# Patient Record
Sex: FEMALE | Race: WHITE | NOT HISPANIC OR LATINO | Employment: OTHER | URBAN - METROPOLITAN AREA
[De-identification: names, ages, dates, MRNs, and addresses within clinical notes are randomized per-mention and may not be internally consistent; named-entity substitution may affect disease eponyms.]

---

## 2017-05-02 ENCOUNTER — HISTORICAL (OUTPATIENT)
Dept: ADMINISTRATIVE | Facility: HOSPITAL | Age: 82
End: 2017-05-02

## 2017-05-02 LAB
BASOPHILS NFR BLD: 0.1 K/UL (ref 0–0.2)
BASOPHILS NFR BLD: 1.1 %
EOSINOPHIL NFR BLD: 0.6 K/UL (ref 0–0.7)
EOSINOPHIL NFR BLD: 4.9 %
ERYTHROCYTE [DISTWIDTH] IN BLOOD BY AUTOMATED COUNT: 17.4 % (ref 12.5–14.5)
GRAN #: 9.5 K/UL (ref 1.4–6.5)
GRAN%: 80.3 %
HCT VFR BLD AUTO: 34.7 % (ref 36–48)
HGB BLD-MCNC: 10.1 G/DL (ref 12–15)
IMMATURE GRANS (ABS): 0.1 K/UL (ref 0–1)
IMMATURE GRANULOCYTES: 0.4 %
LYMPH #: 0.9 K/UL (ref 1.2–3.4)
LYMPH%: 7.3 %
MCH RBC QN AUTO: 24.2 PG (ref 25–35)
MCHC RBC AUTO-ENTMCNC: 29.1 G/DL (ref 31–36)
MCV RBC AUTO: 83 FL (ref 79–98)
MONO #: 0.7 K/UL (ref 0.1–0.6)
MONO%: 6 %
NUCLEATED RBCS: 0 %
NUCLEATED RED BLOOD CELLS: 0 /100 WBC
PERFORMED BY:: ABNORMAL
PLATELET # BLD AUTO: 348 K/UL (ref 140–440)
PMV BLD AUTO: 8.9 FL (ref 8.8–12.7)
RBC # BLD AUTO: 4.18 M/UL (ref 3.5–5.5)
WBC # BLD: 11.9 K/UL (ref 5–10)

## 2017-05-03 VITALS
RESPIRATION RATE: 18 BRPM | DIASTOLIC BLOOD PRESSURE: 73 MMHG | HEART RATE: 76 BPM | WEIGHT: 100.63 LBS | HEIGHT: 63 IN | BODY MASS INDEX: 17.83 KG/M2 | SYSTOLIC BLOOD PRESSURE: 160 MMHG | TEMPERATURE: 98 F

## 2017-05-03 RX ORDER — TRAMADOL HYDROCHLORIDE 50 MG/1
50 TABLET ORAL EVERY 6 HOURS PRN
COMMUNITY
End: 2017-09-07 | Stop reason: SDUPTHER

## 2017-05-03 RX ORDER — ASPIRIN 81 MG/1
81 TABLET ORAL DAILY
COMMUNITY

## 2017-05-03 RX ORDER — CHOLECALCIFEROL (VITAMIN D3) 25 MCG
2000 TABLET ORAL DAILY
COMMUNITY

## 2017-05-03 RX ORDER — ALPRAZOLAM 0.25 MG/1
0.25 TABLET ORAL 3 TIMES DAILY
COMMUNITY
End: 2017-07-12 | Stop reason: SDUPTHER

## 2017-05-03 RX ORDER — METHOTREXATE 2.5 MG/1
TABLET ORAL
COMMUNITY
End: 2017-06-19 | Stop reason: SDUPTHER

## 2017-05-03 RX ORDER — DILTIAZEM HYDROCHLORIDE 120 MG/1
120 CAPSULE, EXTENDED RELEASE ORAL DAILY
COMMUNITY

## 2017-05-08 LAB
ALBUMIN SERPL-MCNC: 3.5 G/DL (ref 3.1–4.7)
ALP SERPL-CCNC: 84 IU/L (ref 40–104)
ALT (SGPT): 21 IU/L (ref 3–33)
AST SERPL-CCNC: 22 IU/L (ref 10–40)
BASOPHILS NFR BLD: 0.1 K/UL (ref 0–0.2)
BASOPHILS NFR BLD: 0.5 %
BILIRUB SERPL-MCNC: 0.3 MG/DL (ref 0.3–1)
BUN SERPL-MCNC: 20 MG/DL (ref 8–20)
CALCIUM SERPL-MCNC: 9.5 MG/DL (ref 7.7–10.4)
CHLORIDE: 99 MMOL/L (ref 98–110)
CO2 SERPL-SCNC: 33.2 MMOL/L (ref 22.8–31.6)
CREATININE: 1.11 MG/DL (ref 0.6–1.4)
EOSINOPHIL NFR BLD: 0.3 K/UL (ref 0–0.7)
EOSINOPHIL NFR BLD: 3 %
ERYTHROCYTE [DISTWIDTH] IN BLOOD BY AUTOMATED COUNT: 19.5 % (ref 12.5–14.5)
GLUCOSE: 135 MG/DL (ref 70–99)
GRAN #: 8.3 K/UL (ref 1.4–6.5)
GRAN%: 86.6 %
HCT VFR BLD AUTO: 36 % (ref 36–48)
HGB BLD-MCNC: 10.4 G/DL (ref 12–15)
IMMATURE GRANS (ABS): 0 K/UL (ref 0–1)
IMMATURE GRANULOCYTES: 0.4 %
LYMPH #: 0.5 K/UL (ref 1.2–3.4)
LYMPH%: 4.8 %
MCH RBC QN AUTO: 24.2 PG (ref 25–35)
MCHC RBC AUTO-ENTMCNC: 28.9 G/DL (ref 31–36)
MCV RBC AUTO: 83.9 FL (ref 79–98)
MONO #: 0.5 K/UL (ref 0.1–0.6)
MONO%: 4.7 %
NUCLEATED RBCS: 0 %
NUCLEATED RED BLOOD CELLS: 0 /100 WBC
PERFORMED BY:: ABNORMAL
PLATELET # BLD AUTO: 265 K/UL (ref 140–440)
PMV BLD AUTO: 9.2 FL (ref 8.8–12.7)
POTASSIUM SERPL-SCNC: 4.6 MMOL/L (ref 3.5–5)
PROT SERPL-MCNC: 6.1 G/DL (ref 6–8.2)
RBC # BLD AUTO: 4.29 M/UL (ref 3.5–5.5)
SODIUM: 140 MMOL/L (ref 134–144)
WBC # BLD: 9.6 K/UL (ref 5–10)

## 2017-05-10 DIAGNOSIS — N18.4 CHRONIC KIDNEY DISEASE, STAGE IV (SEVERE): Chronic | ICD-10-CM

## 2017-05-10 DIAGNOSIS — J44.9 CHRONIC OBSTRUCTIVE PULMONARY DISEASE, UNSPECIFIED COPD TYPE: Chronic | ICD-10-CM

## 2017-05-10 DIAGNOSIS — D63.1 ANEMIA OF CHRONIC RENAL FAILURE, STAGE 4 (SEVERE): Chronic | ICD-10-CM

## 2017-05-10 DIAGNOSIS — I48.20 CHRONIC ATRIAL FIBRILLATION: Chronic | ICD-10-CM

## 2017-05-10 DIAGNOSIS — I01.1 RHEUMATOID AORTITIS: Chronic | ICD-10-CM

## 2017-05-10 DIAGNOSIS — N18.4 ANEMIA OF CHRONIC RENAL FAILURE, STAGE 4 (SEVERE): Chronic | ICD-10-CM

## 2017-05-10 PROBLEM — I48.91 ATRIAL FIBRILLATION: Chronic | Status: ACTIVE | Noted: 2017-05-10

## 2017-05-22 LAB
ALBUMIN SERPL-MCNC: 3.6 G/DL (ref 3.1–4.7)
ALP SERPL-CCNC: 83 IU/L (ref 40–104)
ALT (SGPT): 35 IU/L (ref 3–33)
AST SERPL-CCNC: 43 IU/L (ref 10–40)
BASOPHILS NFR BLD: 0.1 K/UL (ref 0–0.2)
BASOPHILS NFR BLD: 0.7 %
BILIRUB SERPL-MCNC: 0.4 MG/DL (ref 0.3–1)
BUN SERPL-MCNC: 22 MG/DL (ref 8–20)
CALCIUM SERPL-MCNC: 9.6 MG/DL (ref 7.7–10.4)
CHLORIDE: 100 MMOL/L (ref 98–110)
CO2 SERPL-SCNC: 30.3 MMOL/L (ref 22.8–31.6)
CREATININE: 1.27 MG/DL (ref 0.6–1.4)
EOSINOPHIL NFR BLD: 0.1 K/UL (ref 0–0.7)
EOSINOPHIL NFR BLD: 1 %
ERYTHROCYTE [DISTWIDTH] IN BLOOD BY AUTOMATED COUNT: 21.6 % (ref 12.5–14.5)
GLUCOSE: 111 MG/DL (ref 70–99)
GRAN #: 6.3 K/UL (ref 1.4–6.5)
GRAN%: 87.5 %
HCT VFR BLD AUTO: 35.4 % (ref 36–48)
HGB BLD-MCNC: 10.3 G/DL (ref 12–15)
IMMATURE GRANS (ABS): 0 K/UL (ref 0–1)
IMMATURE GRANULOCYTES: 0.3 %
LYMPH #: 0.5 K/UL (ref 1.2–3.4)
LYMPH%: 6.3 %
MCH RBC QN AUTO: 24.9 PG (ref 25–35)
MCHC RBC AUTO-ENTMCNC: 29.1 G/DL (ref 31–36)
MCV RBC AUTO: 85.7 FL (ref 79–98)
MONO #: 0.3 K/UL (ref 0.1–0.6)
MONO%: 4.2 %
NUCLEATED RBCS: 0 %
NUCLEATED RED BLOOD CELLS: 0 /100 WBC
PERFORMED BY:: ABNORMAL
PLATELET # BLD AUTO: 247 K/UL (ref 140–440)
PMV BLD AUTO: 9.2 FL (ref 8.8–12.7)
POTASSIUM SERPL-SCNC: 4.7 MMOL/L (ref 3.5–5)
PROT SERPL-MCNC: 6.1 G/DL (ref 6–8.2)
RBC # BLD AUTO: 4.13 M/UL (ref 3.5–5.5)
SODIUM: 139 MMOL/L (ref 134–144)
WBC # BLD: 7.2 K/UL (ref 5–10)

## 2017-05-29 LAB
ALBUMIN SERPL-MCNC: 3.8 G/DL (ref 3.1–4.7)
ALP SERPL-CCNC: 84 IU/L (ref 40–104)
ALT (SGPT): 24 IU/L (ref 3–33)
AST SERPL-CCNC: 27 IU/L (ref 10–40)
BASOPHILS NFR BLD: 0 K/UL (ref 0–0.2)
BASOPHILS NFR BLD: 0.5 %
BILIRUB SERPL-MCNC: 0.6 MG/DL (ref 0.3–1)
BUN SERPL-MCNC: 21 MG/DL (ref 8–20)
CALCIUM SERPL-MCNC: 9.3 MG/DL (ref 7.7–10.4)
CHLORIDE: 97 MMOL/L (ref 98–110)
CO2 SERPL-SCNC: 32.2 MMOL/L (ref 22.8–31.6)
CREATININE: 1.3 MG/DL (ref 0.6–1.4)
EOSINOPHIL NFR BLD: 0.2 K/UL (ref 0–0.7)
EOSINOPHIL NFR BLD: 2.5 %
ERYTHROCYTE [DISTWIDTH] IN BLOOD BY AUTOMATED COUNT: 22.1 % (ref 12.5–14.5)
GLUCOSE: 97 MG/DL (ref 70–99)
GRAN #: 7.5 K/UL (ref 1.4–6.5)
GRAN%: 85 %
HCT VFR BLD AUTO: 35.4 % (ref 36–48)
HGB BLD-MCNC: 10.3 G/DL (ref 12–15)
IMMATURE GRANS (ABS): 0 K/UL (ref 0–1)
IMMATURE GRANULOCYTES: 0.5 %
LYMPH #: 0.6 K/UL (ref 1.2–3.4)
LYMPH%: 7 %
MCH RBC QN AUTO: 25.4 PG (ref 25–35)
MCHC RBC AUTO-ENTMCNC: 29.1 G/DL (ref 31–36)
MCV RBC AUTO: 87.4 FL (ref 79–98)
MONO #: 0.4 K/UL (ref 0.1–0.6)
MONO%: 4.5 %
NUCLEATED RBCS: 0 %
NUCLEATED RED BLOOD CELLS: 0 /100 WBC
PERFORMED BY:: ABNORMAL
PLATELET # BLD AUTO: 260 K/UL (ref 140–440)
PMV BLD AUTO: 8.9 FL (ref 8.8–12.7)
POTASSIUM SERPL-SCNC: 4.3 MMOL/L (ref 3.5–5)
PROT SERPL-MCNC: 6.3 G/DL (ref 6–8.2)
RBC # BLD AUTO: 4.05 M/UL (ref 3.5–5.5)
SODIUM: 138 MMOL/L (ref 134–144)
WBC # BLD: 8.8 K/UL (ref 5–10)

## 2017-06-05 ENCOUNTER — HISTORICAL (OUTPATIENT)
Dept: ADMINISTRATIVE | Facility: HOSPITAL | Age: 82
End: 2017-06-05

## 2017-06-05 LAB
ALBUMIN SERPL-MCNC: 3.8 G/DL (ref 3.1–4.7)
ALP SERPL-CCNC: 79 IU/L (ref 40–104)
ALT (SGPT): 26 IU/L (ref 3–33)
AST SERPL-CCNC: 24 IU/L (ref 10–40)
BASOPHILS NFR BLD: 0.1 K/UL (ref 0–0.2)
BASOPHILS NFR BLD: 0.7 %
BILIRUB SERPL-MCNC: 0.5 MG/DL (ref 0.3–1)
BUN SERPL-MCNC: 19 MG/DL (ref 8–20)
CALCIUM SERPL-MCNC: 9.5 MG/DL (ref 7.7–10.4)
CHLORIDE: 99 MMOL/L (ref 98–110)
CO2 SERPL-SCNC: 30.4 MMOL/L (ref 22.8–31.6)
CREATININE: 1.21 MG/DL (ref 0.6–1.4)
EOSINOPHIL NFR BLD: 0.2 K/UL (ref 0–0.7)
EOSINOPHIL NFR BLD: 2.4 %
ERYTHROCYTE [DISTWIDTH] IN BLOOD BY AUTOMATED COUNT: 22.4 % (ref 12.5–14.5)
GLUCOSE: 110 MG/DL (ref 70–99)
GRAN #: 7 K/UL (ref 1.4–6.5)
GRAN%: 84.5 %
HCT VFR BLD AUTO: 36.3 % (ref 36–48)
HGB BLD-MCNC: 10.9 G/DL (ref 12–15)
IMMATURE GRANS (ABS): 0 K/UL (ref 0–1)
IMMATURE GRANULOCYTES: 0.4 %
LYMPH #: 0.5 K/UL (ref 1.2–3.4)
LYMPH%: 6.4 %
MCH RBC QN AUTO: 26.5 PG (ref 25–35)
MCHC RBC AUTO-ENTMCNC: 30 G/DL (ref 31–36)
MCV RBC AUTO: 88.1 FL (ref 79–98)
MONO #: 0.5 K/UL (ref 0.1–0.6)
MONO%: 5.6 %
NUCLEATED RBCS: 0 %
NUCLEATED RED BLOOD CELLS: 0 /100 WBC
PERFORMED BY:: ABNORMAL
PLATELET # BLD AUTO: 250 K/UL (ref 140–440)
PMV BLD AUTO: 9.3 FL (ref 8.8–12.7)
POTASSIUM SERPL-SCNC: 4.2 MMOL/L (ref 3.5–5)
PROT SERPL-MCNC: 6.4 G/DL (ref 6–8.2)
RBC # BLD AUTO: 4.12 M/UL (ref 3.5–5.5)
SODIUM: 139 MMOL/L (ref 134–144)
WBC # BLD: 8.2 K/UL (ref 5–10)

## 2017-06-12 LAB
ALBUMIN SERPL-MCNC: 3.9 G/DL (ref 3.1–4.7)
ALP SERPL-CCNC: 73 IU/L (ref 40–104)
ALT (SGPT): 32 IU/L (ref 3–33)
AST SERPL-CCNC: 38 IU/L (ref 10–40)
BASOPHILS NFR BLD: 0.1 K/UL (ref 0–0.2)
BASOPHILS NFR BLD: 0.8 %
BILIRUB SERPL-MCNC: 0.4 MG/DL (ref 0.3–1)
BUN SERPL-MCNC: 18 MG/DL (ref 8–20)
CALCIUM SERPL-MCNC: 9.3 MG/DL (ref 7.7–10.4)
CHLORIDE: 103 MMOL/L (ref 98–110)
CO2 SERPL-SCNC: 29 MMOL/L (ref 22.8–31.6)
CREATININE: 1.21 MG/DL (ref 0.6–1.4)
EOSINOPHIL NFR BLD: 0.3 K/UL (ref 0–0.7)
EOSINOPHIL NFR BLD: 2.7 %
ERYTHROCYTE [DISTWIDTH] IN BLOOD BY AUTOMATED COUNT: 22.6 % (ref 12.5–14.5)
GLUCOSE: 113 MG/DL (ref 70–99)
GRAN #: 8.8 K/UL (ref 1.4–6.5)
GRAN%: 81.6 %
HCT VFR BLD AUTO: 37.3 % (ref 36–48)
HGB BLD-MCNC: 11.3 G/DL (ref 12–15)
IMMATURE GRANS (ABS): 0 K/UL (ref 0–1)
IMMATURE GRANULOCYTES: 0.4 %
LYMPH #: 0.8 K/UL (ref 1.2–3.4)
LYMPH%: 7.6 %
MCH RBC QN AUTO: 27 PG (ref 25–35)
MCHC RBC AUTO-ENTMCNC: 30.3 G/DL (ref 31–36)
MCV RBC AUTO: 89 FL (ref 79–98)
MONO #: 0.7 K/UL (ref 0.1–0.6)
MONO%: 6.9 %
NUCLEATED RBCS: 0 %
NUCLEATED RED BLOOD CELLS: 0 /100 WBC
PERFORMED BY:: ABNORMAL
PLATELET # BLD AUTO: 307 K/UL (ref 140–440)
PMV BLD AUTO: 9.4 FL (ref 8.8–12.7)
POTASSIUM SERPL-SCNC: 4.2 MMOL/L (ref 3.5–5)
PROT SERPL-MCNC: 6.5 G/DL (ref 6–8.2)
RBC # BLD AUTO: 4.19 M/UL (ref 3.5–5.5)
SODIUM: 139 MMOL/L (ref 134–144)
WBC # BLD: 10.7 K/UL (ref 5–10)

## 2017-06-19 ENCOUNTER — OFFICE VISIT (OUTPATIENT)
Dept: RHEUMATOLOGY | Facility: CLINIC | Age: 82
End: 2017-06-19
Payer: MEDICARE

## 2017-06-19 VITALS
BODY MASS INDEX: 19.14 KG/M2 | WEIGHT: 108 LBS | SYSTOLIC BLOOD PRESSURE: 140 MMHG | HEIGHT: 63 IN | DIASTOLIC BLOOD PRESSURE: 68 MMHG

## 2017-06-19 DIAGNOSIS — I01.1 RHEUMATOID AORTITIS: Primary | ICD-10-CM

## 2017-06-19 LAB
ALBUMIN SERPL-MCNC: 3.9 G/DL (ref 3.1–4.7)
ALP SERPL-CCNC: 72 IU/L (ref 40–104)
ALT (SGPT): 26 IU/L (ref 3–33)
AST SERPL-CCNC: 27 IU/L (ref 10–40)
BASOPHILS NFR BLD: 0.1 K/UL (ref 0–0.2)
BASOPHILS NFR BLD: 0.5 %
BILIRUB SERPL-MCNC: 0.5 MG/DL (ref 0.3–1)
BUN SERPL-MCNC: 21 MG/DL (ref 8–20)
CALCIUM SERPL-MCNC: 9.3 MG/DL (ref 7.7–10.4)
CHLORIDE: 102 MMOL/L (ref 98–110)
CO2 SERPL-SCNC: 29.4 MMOL/L (ref 22.8–31.6)
CREATININE: 1.26 MG/DL (ref 0.6–1.4)
EOSINOPHIL NFR BLD: 0 K/UL (ref 0–0.7)
EOSINOPHIL NFR BLD: 0.1 %
ERYTHROCYTE [DISTWIDTH] IN BLOOD BY AUTOMATED COUNT: 22.5 % (ref 12.5–14.5)
GLUCOSE: 112 MG/DL (ref 70–99)
GRAN #: 8.3 K/UL (ref 1.4–6.5)
GRAN%: 89 %
HCT VFR BLD AUTO: 34.2 % (ref 36–48)
HGB BLD-MCNC: 10.5 G/DL (ref 12–15)
IMMATURE GRANS (ABS): 0 K/UL (ref 0–1)
IMMATURE GRANULOCYTES: 0.3 %
LYMPH #: 0.5 K/UL (ref 1.2–3.4)
LYMPH%: 5.2 %
MCH RBC QN AUTO: 27.5 PG (ref 25–35)
MCHC RBC AUTO-ENTMCNC: 30.7 G/DL (ref 31–36)
MCV RBC AUTO: 89.5 FL (ref 79–98)
MONO #: 0.5 K/UL (ref 0.1–0.6)
MONO%: 4.9 %
NUCLEATED RBCS: 0 %
NUCLEATED RED BLOOD CELLS: 0 /100 WBC
PERFORMED BY:: ABNORMAL
PLATELET # BLD AUTO: 253 K/UL (ref 140–440)
PMV BLD AUTO: 9.3 FL (ref 8.8–12.7)
POTASSIUM SERPL-SCNC: 5 MMOL/L (ref 3.5–5)
PROT SERPL-MCNC: 6.4 G/DL (ref 6–8.2)
RBC # BLD AUTO: 3.82 M/UL (ref 3.5–5.5)
SODIUM: 138 MMOL/L (ref 134–144)
WBC # BLD: 9.3 K/UL (ref 5–10)

## 2017-06-19 PROCEDURE — 1159F MED LIST DOCD IN RCRD: CPT | Mod: ,,, | Performed by: INTERNAL MEDICINE

## 2017-06-19 PROCEDURE — 1125F AMNT PAIN NOTED PAIN PRSNT: CPT | Mod: ,,, | Performed by: INTERNAL MEDICINE

## 2017-06-19 PROCEDURE — 99213 OFFICE O/P EST LOW 20 MIN: CPT | Mod: ,,, | Performed by: INTERNAL MEDICINE

## 2017-06-19 RX ORDER — FAMOTIDINE 20 MG/1
20 TABLET, FILM COATED ORAL 2 TIMES DAILY
COMMUNITY

## 2017-06-19 RX ORDER — METHOTREXATE 2.5 MG/1
10 TABLET ORAL
Qty: 16 TABLET | Refills: 2 | Status: SHIPPED | OUTPATIENT
Start: 2017-06-19 | End: 2017-12-01 | Stop reason: SDUPTHER

## 2017-06-19 RX ORDER — PREDNISONE 5 MG/1
5 TABLET ORAL DAILY
Qty: 100 TABLET | Refills: 1 | Status: SHIPPED | OUTPATIENT
Start: 2017-06-19

## 2017-06-19 NOTE — PROGRESS NOTES
[facility] RHEUMATOLOGY            PROGRESS NOTE      Subjective:       Patient ID:   NAME: Michelle Power : 3/13/1934     83 y.o. female    Referring Doc: Self, Aaareferral  Other Physicians:    Chief Complaint:  Rheumatoid Arthritis and Pain (6/10 - had PT today and in a lot of pain)      History of Present Illness:     Patient returns today for a regularly scheduled follow-up visit for RA      The patient states she is overall ok  Denies  fevers ,chest pains or significant cough.  No gastrointestinal complaints.  No joint swelling.  Experiencing fluid retention ( pedal edema)  No significant morning stiffness.          ROS:   GEN:  No  fever, night sweats . weight is stable   + fatigue  SKIN: no rashes, no bruising, no ulcerations, no Raynaud's  HEENT: no HA's, No visual changes, no mucosal ulcers, no sicca symptoms,  CV:   no CP, +SOB, PND ,+ COFFMAN, no orthopnea, no palpitations  PULM: + SOB, mild cough,No  hemoptysis, sputum or pleuritic pain  GI:  no abdominal pain, nausea, vomiting, constipation, diarrhea, melanotic stools, BRBPR, hematemesis, no dysphagia  :   no dysuria  NEURO: no paresthesias, headaches, visual disturbances, muscle weakness  MUSCULOSKELETAL:no joint swelling, prolonged AM stiffness, no back pain, no muscle pain  Allergies:  Review of patient's allergies indicates:   Allergen Reactions    Codeine     Pcn [penicillins]     Plaquenil [hydroxychloroquine]     Shellfish containing products     Sulfa (sulfonamide antibiotics)        Medications:    Current Outpatient Prescriptions:     alprazolam (XANAX) 0.25 MG tablet, Take 0.25 mg by mouth 3 (three) times daily., Disp: , Rfl:     aspirin (ECOTRIN) 81 MG EC tablet, Take 81 mg by mouth once daily., Disp: , Rfl:     BACILLUS COAGULANS (PROBIOTIC, B. COAGULANS, ORAL), Take by mouth., Disp: , Rfl:     diltiaZEM (DILACOR XR) 120 MG CDCR, Take 120 mg by mouth once daily., Disp: , Rfl:     famotidine (PEPCID) 20 MG tablet, Take 20 mg by  "mouth 2 (two) times daily., Disp: , Rfl:     FLUTICASONE/VILANTEROL (BREO ELLIPTA INHL), Inhale into the lungs., Disp: , Rfl:     methotrexate 2.5 MG Tab, Take 4 tablets (10 mg total) by mouth every 7 days., Disp: 16 tablet, Rfl: 2    PRENATAL #108-IRON,CARBONYL-FA ORAL, Take by mouth., Disp: , Rfl:     tramadol (ULTRAM) 50 mg tablet, Take 50 mg by mouth every 6 (six) hours as needed for Pain., Disp: , Rfl:     UMECLIDINIUM BROMIDE (INCRUSE ELLIPTA INHL), Inhale into the lungs., Disp: , Rfl:     vitamin D 1000 units Tab, Take 2,000 Units by mouth once daily., Disp: , Rfl:     CEFTRIAXONE SODIUM (ROCEPHIN INJ), Inject as directed., Disp: , Rfl:     predniSONE (DELTASONE) 5 MG tablet, Take 1 tablet (5 mg total) by mouth once daily., Disp: 100 tablet, Rfl: 1    PMHx/PSHx Updates:  Pulmonary Nocardiosis      Objective:     Vitals:  Blood pressure (!) 140/68, height 5' 3" (1.6 m), weight 49 kg (108 lb).    Physical Examination:   GEN: no apparent distress, comfortable; AAOx3  SKIN: no rashes,no ulceration, no Raynaud's, no petechiae, no SQ nodules,  HEAD: normal  EYES: no pallor, no icterus, PERRLA  NECK: no masses, thyroid normal, trachea midline, no LAD/LN's, supple  CV: RRR with no murmur; l S1 and S2 reg. ,no gallop no rubs,   CHEST:  normal breath sounds; no wheeze or crackles  ABDOM: nontender and nondistended; soft; no masses; no rebound/guarding  MUSC/Skeletal: ROM normal; no crepitus; joints without synovitis,  no deformities  No joint swelling or tenderness of PIP, MCP, wrist, elbow, shoulder, or knee joints  EXTREM: no clubbing, cyanosis, no edema,normal  pulses   NEURO: grossly intact; motor WNL; AAOx3; ,PSYCH: normal mood, affect and behavior  LYMPH: normal cervical, supraclavicular          Labs:   Lab Results   Component Value Date    WBC 9.3 06/19/2017    HGB 10.5 (L) 06/19/2017    HCT 34.2 (L) 06/19/2017     06/19/2017    " CMP  @LASTLAB(NA,K,CL,CO2,GLU,BUN,Creatinine,Calcium,PROT,Albumin,Bilitot,Alkphos,AST,ALT,CRP,ESR,RF,CCP,JIM,SSA,CPK,uric acid) )@  I have reviewed all available lab results and radiology reports.    Radiology/Diagnostic Studies:        Assessment/Plan:   (1) 83 y.o. female with diagnosis of Rheumatoid arthritis. She is stable.                  Discussion:     I have explained all of the above in detail and the patient understands all of the current recommendation(s). I have answered all questions to the best of my ability and to their complete satisfaction.       The patient is to continue with the current management plan         RTC in   3 months      Electronically signed by Vahid Hernandez MD

## 2017-06-22 ENCOUNTER — OFFICE VISIT (OUTPATIENT)
Dept: FAMILY MEDICINE | Facility: CLINIC | Age: 82
End: 2017-06-22
Payer: MEDICARE

## 2017-06-22 VITALS
TEMPERATURE: 99 F | HEIGHT: 63 IN | SYSTOLIC BLOOD PRESSURE: 126 MMHG | HEART RATE: 68 BPM | WEIGHT: 108 LBS | BODY MASS INDEX: 19.14 KG/M2 | RESPIRATION RATE: 20 BRPM | DIASTOLIC BLOOD PRESSURE: 64 MMHG

## 2017-06-22 DIAGNOSIS — S69.91XA INJURY OF NAIL BED OF FINGER OF RIGHT HAND, INITIAL ENCOUNTER: ICD-10-CM

## 2017-06-22 DIAGNOSIS — R60.1 GENERALIZED EDEMA: ICD-10-CM

## 2017-06-22 DIAGNOSIS — J01.00 ACUTE NON-RECURRENT MAXILLARY SINUSITIS: ICD-10-CM

## 2017-06-22 PROCEDURE — 99213 OFFICE O/P EST LOW 20 MIN: CPT | Mod: ,,, | Performed by: FAMILY MEDICINE

## 2017-06-22 PROCEDURE — 1159F MED LIST DOCD IN RCRD: CPT | Mod: ,,, | Performed by: FAMILY MEDICINE

## 2017-06-22 RX ORDER — PROMETHAZINE HYDROCHLORIDE AND DEXTROMETHORPHAN HYDROBROMIDE 6.25; 15 MG/5ML; MG/5ML
5 SYRUP ORAL 4 TIMES DAILY
Qty: 180 ML | Refills: 2 | Status: SHIPPED | OUTPATIENT
Start: 2017-06-22 | End: 2017-07-02

## 2017-06-22 RX ORDER — AZITHROMYCIN 250 MG/1
250 TABLET, FILM COATED ORAL DAILY
Qty: 6 TABLET | Refills: 0 | Status: SHIPPED | OUTPATIENT
Start: 2017-06-22 | End: 2017-09-11 | Stop reason: ALTCHOICE

## 2017-06-22 NOTE — PROGRESS NOTES
Subjective:       Patient ID: Michelle Power is a 83 y.o. female.    Chief Complaint: Sinus Problem; Leg Swelling; Foot Swelling; and Hand Pain    Sinus Problem   The current episode started 1 to 4 weeks ago (2-3 weeks). The problem has been waxing and waning since onset. There has been no fever. The fever has been present for less than 1 day. The pain is mild. Past treatments include acetaminophen (mucinex). The treatment provided no relief.   Hand Pain    Incident onset: deformity of right 5th nail had infection 4 weeks ago. Pertinent negatives include no chest pain.   Edema   This is a recurrent problem. The current episode started 1 to 4 weeks ago. The problem occurs daily. The problem has been gradually worsening. Pertinent negatives include no arthralgias, chest pain or fever. Exacerbated by: stockings are too tight -doesnt wear them.     Review of Systems   Constitutional: Negative for fever.   Cardiovascular: Negative for chest pain.   Musculoskeletal: Negative for arthralgias.       Objective:      Physical Exam   Constitutional: She appears well-developed and well-nourished. No distress.   HENT:   Head: Normocephalic and atraumatic.   Right Ear: External ear normal.   Left Ear: External ear normal.   Eyes: Conjunctivae and EOM are normal. Pupils are equal, round, and reactive to light. Right eye exhibits no discharge. Left eye exhibits no discharge. No scleral icterus.   Neck: Normal range of motion. Neck supple.   Cardiovascular: Normal rate, regular rhythm, normal heart sounds and intact distal pulses.    Pulmonary/Chest: Effort normal and breath sounds normal.   Musculoskeletal:        Legs:  Bilateral +3 edema with out compression.   Skin: She is not diaphoretic.          Both legs wrapped with ace wraps.  Assessment:       1. Generalized edema    2. Injury of nail bed of finger of right hand, initial encounter    3. Acute non-recurrent maxillary sinusitis        Plan:       Michelle was seen today for  sinus problem, leg swelling, foot swelling and hand pain.    Diagnoses and all orders for this visit:    Generalized edema    Injury of nail bed of finger of right hand, initial encounter    Acute non-recurrent maxillary sinusitis  -     azithromycin (Z-TRISH) 250 MG tablet; Take 1 tablet (250 mg total) by mouth once daily. po on day 1 then 1 tab po on days 2-5  -     promethazine-dextromethorphan (PROMETHAZINE-DM) 6.25-15 mg/5 mL Syrp; Take 5 mLs by mouth 4 (four) times daily.         Return in about 3 months (around 9/22/2017).

## 2017-06-26 LAB
HCT VFR BLD AUTO: 27.7 % (ref 36–48)
HGB BLD-MCNC: 8.8 G/DL (ref 12–15)

## 2017-06-27 LAB
BUN SERPL-MCNC: 21 MG/DL (ref 8–20)
CALCIUM SERPL-MCNC: 8.3 MG/DL (ref 7.7–10.4)
CHLORIDE: 99 MMOL/L (ref 98–110)
CO2 SERPL-SCNC: 27.9 MMOL/L (ref 22.8–31.6)
CREATININE: 1.12 MG/DL (ref 0.6–1.4)
GLUCOSE: 130 MG/DL (ref 70–99)
HCT VFR BLD AUTO: 24.9 % (ref 36–48)
HGB BLD-MCNC: 8.1 G/DL (ref 12–15)
MCH RBC QN AUTO: 28.6 PG (ref 25–35)
MCHC RBC AUTO-ENTMCNC: 32.5 G/DL (ref 31–36)
MCV RBC AUTO: 88 FL (ref 79–98)
NUCLEATED RBCS: 0 %
PLATELET # BLD AUTO: 226 K/UL (ref 140–440)
POTASSIUM SERPL-SCNC: 4.4 MMOL/L (ref 3.5–5)
RBC # BLD AUTO: 2.83 M/UL (ref 3.5–5.5)
SODIUM: 136 MMOL/L (ref 134–144)
WBC # BLD AUTO: 13.6 K/UL (ref 5–10)

## 2017-06-28 LAB
ALBUMIN SERPL-MCNC: 2.9 G/DL (ref 3.1–4.7)
ALP SERPL-CCNC: 44 IU/L (ref 40–104)
ALT (SGPT): 11 IU/L (ref 3–33)
AST SERPL-CCNC: 18 IU/L (ref 10–40)
BILIRUB SERPL-MCNC: 0.8 MG/DL (ref 0.3–1)
BUN SERPL-MCNC: 25 MG/DL (ref 8–20)
CALCIUM SERPL-MCNC: 8.9 MG/DL (ref 7.7–10.4)
CHLORIDE: 106 MMOL/L (ref 98–110)
CO2 SERPL-SCNC: 26.8 MMOL/L (ref 22.8–31.6)
CREATININE: 1.09 MG/DL (ref 0.6–1.4)
GLUCOSE: 130 MG/DL (ref 70–99)
HCT VFR BLD AUTO: 27.3 % (ref 36–48)
HGB BLD-MCNC: 9.1 G/DL (ref 12–15)
MCH RBC QN AUTO: 29.6 PG (ref 25–35)
MCHC RBC AUTO-ENTMCNC: 33.3 G/DL (ref 31–36)
MCV RBC AUTO: 88.9 FL (ref 79–98)
NUCLEATED RBCS: 0 %
PLATELET # BLD AUTO: 193 K/UL (ref 140–440)
POTASSIUM SERPL-SCNC: 3.8 MMOL/L (ref 3.5–5)
PROT SERPL-MCNC: 5.1 G/DL (ref 6–8.2)
RBC # BLD AUTO: 3.07 M/UL (ref 3.5–5.5)
SODIUM: 141 MMOL/L (ref 134–144)
WBC # BLD AUTO: 9.3 K/UL (ref 5–10)

## 2017-06-29 LAB
BUN SERPL-MCNC: 29 MG/DL (ref 8–20)
CALCIUM SERPL-MCNC: 8.5 MG/DL (ref 7.7–10.4)
CHLORIDE: 104 MMOL/L (ref 98–110)
CO2 SERPL-SCNC: 30 MMOL/L (ref 22.8–31.6)
CREATININE: 1.21 MG/DL (ref 0.6–1.4)
GLUCOSE: 134 MG/DL (ref 70–99)
HCT VFR BLD AUTO: 31 % (ref 36–48)
HGB BLD-MCNC: 10.3 G/DL (ref 12–15)
MCH RBC QN AUTO: 29.3 PG (ref 25–35)
MCHC RBC AUTO-ENTMCNC: 33.2 G/DL (ref 31–36)
MCV RBC AUTO: 88.1 FL (ref 79–98)
NUCLEATED RBCS: 0 %
PLATELET # BLD AUTO: 183 K/UL (ref 140–440)
POTASSIUM SERPL-SCNC: 3.9 MMOL/L (ref 3.5–5)
RBC # BLD AUTO: 3.52 M/UL (ref 3.5–5.5)
SODIUM: 140 MMOL/L (ref 134–144)
WBC # BLD AUTO: 8.4 K/UL (ref 5–10)

## 2017-07-05 RX ORDER — CALCITONIN SALMON 200 [IU]/.09ML
SPRAY, METERED NASAL
Qty: 11.1 ML | Refills: 0 | OUTPATIENT
Start: 2017-07-05

## 2017-07-10 ENCOUNTER — OFFICE VISIT (OUTPATIENT)
Dept: HEMATOLOGY/ONCOLOGY | Facility: CLINIC | Age: 82
End: 2017-07-10
Payer: MEDICARE

## 2017-07-10 VITALS
WEIGHT: 108 LBS | HEART RATE: 80 BPM | SYSTOLIC BLOOD PRESSURE: 117 MMHG | DIASTOLIC BLOOD PRESSURE: 63 MMHG | BODY MASS INDEX: 19.14 KG/M2 | HEIGHT: 63 IN | TEMPERATURE: 98 F | RESPIRATION RATE: 18 BRPM

## 2017-07-10 DIAGNOSIS — N18.4 ANEMIA OF CHRONIC RENAL FAILURE, STAGE 4 (SEVERE): Chronic | ICD-10-CM

## 2017-07-10 DIAGNOSIS — D63.1 ANEMIA OF CHRONIC RENAL FAILURE, STAGE 4 (SEVERE): Chronic | ICD-10-CM

## 2017-07-10 PROCEDURE — 99213 OFFICE O/P EST LOW 20 MIN: CPT | Mod: ,,, | Performed by: INTERNAL MEDICINE

## 2017-07-10 PROCEDURE — 1159F MED LIST DOCD IN RCRD: CPT | Mod: ,,, | Performed by: INTERNAL MEDICINE

## 2017-07-10 PROCEDURE — 1125F AMNT PAIN NOTED PAIN PRSNT: CPT | Mod: ,,, | Performed by: INTERNAL MEDICINE

## 2017-07-10 RX ORDER — HYDROCODONE BITARTRATE AND ACETAMINOPHEN 7.5; 325 MG/1; MG/1
TABLET ORAL
Refills: 0 | COMMUNITY
Start: 2017-07-02 | End: 2017-07-12 | Stop reason: SDUPTHER

## 2017-07-10 RX ORDER — CALCITONIN SALMON 200 [IU]/.09ML
1 SPRAY, METERED NASAL DAILY
COMMUNITY
End: 2017-09-11 | Stop reason: ALTCHOICE

## 2017-07-10 RX ORDER — FLUTICASONE FUROATE AND VILANTEROL TRIFENATATE 100; 25 UG/1; UG/1
POWDER RESPIRATORY (INHALATION)
Refills: 0 | COMMUNITY
Start: 2017-06-30 | End: 2017-11-22

## 2017-07-10 NOTE — ASSESSMENT & PLAN NOTE
Patient was doing quite well on procrit treatment until arm surgery which has necessitated PRBC infusions.  She is currently 10.3g/dl and will resume when less than 10.  Patient doing well otherwise.

## 2017-07-10 NOTE — LETTER
July 10, 2017      Inocente Ramirez MD  [address] [place] [address]           [facility] Hematology Oncology  [address]  [address]  [place] [address]  Phone: [phone]  Fax: [phone]          Patient: Michelle Power   MR Number: [id number]   YOB: 1934   Date of Visit: 7/10/2017       Dear Dr. Inocente Ramirez:    Thank you for referring Michelle Power to me for evaluation. Attached you will find relevant portions of my assessment and plan of care.    If you have questions, please do not hesitate to call me. I look forward to following Michelle Power along with you.    Sincerely,    Reed Dumont MD    Enclosure  CC:  No Recipients    If you would like to receive this communication electronically, please contact [email or web address] or [phone] to request more information on [email or web address] Link access.    For providers and/or their staff who would like to refer a patient to [facility], please contact us through our one-stop-shop provider referral line, [facility], at [phone].    If you feel you have received this communication in error or would no longer like to receive these types of communications, please e-mail [email or web address]

## 2017-07-10 NOTE — PROGRESS NOTES
PROGRESS NOTE    Subjective:       Patient ID: Michelle Power is a 83 y.o. female.    Chief Complaint:  Follow-up and Results (labs on front of chart)      History of Present Illness:   Michelle Power is a 83 y.o. female who presents with a history of Anemia of chronic renal failure.  Patient reports a fall and right arm fracture which was surgically repaired 2 weeks ago.  Patient had been feeling well up to that point.        Family and Social history reviewed and is unchanged from 3/15/2017.       ROS:  Review of Systems   Constitutional: Negative for fever.   Respiratory: Negative for shortness of breath.    Cardiovascular: Negative for chest pain and leg swelling.   Gastrointestinal: Negative for abdominal pain and blood in stool.   Genitourinary: Negative for hematuria.   Skin: Negative for rash.          Current Outpatient Prescriptions:     alprazolam (XANAX) 0.25 MG tablet, Take 0.25 mg by mouth 3 (three) times daily., Disp: , Rfl:     aspirin (ECOTRIN) 81 MG EC tablet, Take 81 mg by mouth once daily., Disp: , Rfl:     azithromycin (Z-TRISH) 250 MG tablet, Take 1 tablet (250 mg total) by mouth once daily. po on day 1 then 1 tab po on days 2-5, Disp: 6 tablet, Rfl: 0    BACILLUS COAGULANS (PROBIOTIC, B. COAGULANS, ORAL), Take by mouth., Disp: , Rfl:     BREO ELLIPTA 100-25 mcg/dose diskus inhaler, U 1 INHALATION BLISTER D, Disp: , Rfl: 0    calcitonin, salmon, (FORTICAL) 200 unit/actuation nasal spray, 1 spray by Nasal route once daily., Disp: , Rfl:     CEFTRIAXONE SODIUM (ROCEPHIN INJ), Inject as directed., Disp: , Rfl:     diltiaZEM (DILACOR XR) 120 MG CDCR, Take 120 mg by mouth once daily., Disp: , Rfl:     famotidine (PEPCID) 20 MG tablet, Take 20 mg by mouth 2 (two) times daily., Disp: , Rfl:     FLUTICASONE/VILANTEROL (BREO ELLIPTA INHL), Inhale into the lungs., Disp: , Rfl:     hydrocodone-acetaminophen 7.5-325mg (NORCO) 7.5-325 mg per  "tablet, TK ONE T PO Q 4 TO 6 H PRN P, Disp: , Rfl: 0    methotrexate 2.5 MG Tab, Take 4 tablets (10 mg total) by mouth every 7 days., Disp: 16 tablet, Rfl: 2    predniSONE (DELTASONE) 5 MG tablet, Take 1 tablet (5 mg total) by mouth once daily., Disp: 100 tablet, Rfl: 1    PRENATAL #108-IRON,CARBONYL-FA ORAL, Take by mouth., Disp: , Rfl:     tramadol (ULTRAM) 50 mg tablet, Take 50 mg by mouth every 6 (six) hours as needed for Pain., Disp: , Rfl:     UMECLIDINIUM BROMIDE (INCRUSE ELLIPTA INHL), Inhale into the lungs., Disp: , Rfl:     vitamin D 1000 units Tab, Take 2,000 Units by mouth once daily., Disp: , Rfl:         Objective:       Physical Examination:     /63   Pulse 80   Temp 98.3 °F (36.8 °C)   Resp 18   Ht 5' 3" (1.6 m)   Wt 49 kg (108 lb)   BMI 19.13 kg/m²     Physical Exam   Constitutional: She appears well-developed and well-nourished.   HENT:   Head: Normocephalic and atraumatic.   Right Ear: External ear normal.   Left Ear: External ear normal.   Mouth/Throat: Oropharynx is clear and moist.   Eyes: Conjunctivae are normal. Pupils are equal, round, and reactive to light.   Neck: No tracheal deviation present. No thyromegaly present.   Cardiovascular: Normal rate, regular rhythm and normal heart sounds.    Pulmonary/Chest: Effort normal and breath sounds normal.   Abdominal: Soft. Bowel sounds are normal. She exhibits no distension and no mass. There is no tenderness.   Musculoskeletal: She exhibits no edema.   Neurological:   Neuro intact througout   Skin: No rash noted.   Psychiatric: She has a normal mood and affect. Her behavior is normal. Judgment and thought content normal.       Labs:   No results found for this or any previous visit (from the past 336 hour(s)).  CMP  Sodium   Date Value Ref Range Status   06/29/2017 140 134 - 144 mmol/L      Potassium   Date Value Ref Range Status   06/29/2017 3.9 3.5 - 5.0 mmol/L      Chloride   Date Value Ref Range Status   06/29/2017 104 98 - " 110 mmol/L      CO2   Date Value Ref Range Status   06/29/2017 30.0 22.8 - 31.6 mmol/L      Glucose   Date Value Ref Range Status   06/29/2017 134 (H) 70 - 99 mg/dL      BUN, Bld   Date Value Ref Range Status   06/29/2017 29 (H) 8 - 20 mg/dL      Creatinine   Date Value Ref Range Status   06/29/2017 1.21 0.60 - 1.40 mg/dL      Calcium   Date Value Ref Range Status   06/29/2017 8.5 7.7 - 10.4 mg/dL      Total Protein   Date Value Ref Range Status   06/28/2017 5.1 (L) 6.0 - 8.2 g/dL      Albumin   Date Value Ref Range Status   06/28/2017 2.9 (L) 3.1 - 4.7 g/dL      Total Bilirubin   Date Value Ref Range Status   06/28/2017 0.8 0.3 - 1.0 mg/dL      Alkaline Phosphatase   Date Value Ref Range Status   06/28/2017 44 40 - 104 IU/L      AST   Date Value Ref Range Status   06/28/2017 18 10 - 40 IU/L      No results found for: CEA  No results found for: PSA        Assessment/Plan:     Problem List Items Addressed This Visit     Anemia of chronic renal failure, stage 4 (severe) (Chronic)     Patient was doing quite well on procrit treatment until arm surgery which has necessitated PRBC infusions.  She is currently 10.3g/dl and will resume when less than 10.  Patient doing well otherwise.             Other Visit Diagnoses    None.         Discussion:     No Follow-up on file.      Electronically signed by Reed Goldstein

## 2017-07-12 DIAGNOSIS — G89.18 POST-OPERATIVE PAIN: Primary | ICD-10-CM

## 2017-07-12 RX ORDER — HYDROCODONE BITARTRATE AND ACETAMINOPHEN 7.5; 325 MG/1; MG/1
1 TABLET ORAL EVERY 8 HOURS PRN
Qty: 60 TABLET | Refills: 0 | Status: SHIPPED | OUTPATIENT
Start: 2017-07-12 | End: 2017-08-02 | Stop reason: SDUPTHER

## 2017-07-12 RX ORDER — SERTRALINE HYDROCHLORIDE 25 MG/1
25 TABLET, FILM COATED ORAL DAILY
Qty: 30 TABLET | Refills: 11 | Status: SHIPPED | OUTPATIENT
Start: 2017-07-12 | End: 2017-11-21

## 2017-07-12 RX ORDER — HYDROCODONE BITARTRATE AND ACETAMINOPHEN 7.5; 325 MG/1; MG/1
1 TABLET ORAL EVERY 8 HOURS PRN
Qty: 60 TABLET | Refills: 0 | OUTPATIENT
Start: 2017-07-12

## 2017-07-12 RX ORDER — ALPRAZOLAM 0.25 MG/1
0.25 TABLET ORAL 2 TIMES DAILY PRN
Qty: 60 TABLET | Refills: 1 | Status: SHIPPED | OUTPATIENT
Start: 2017-07-12 | End: 2017-09-14 | Stop reason: SDUPTHER

## 2017-07-12 NOTE — TELEPHONE ENCOUNTER
Patient called requesting refill on Norco. Terry per Dr. Willian Tolbert 7.5 # 60 every eight hours.

## 2017-07-20 LAB
BASOPHILS NFR BLD: 0.1 K/UL (ref 0–0.2)
BASOPHILS NFR BLD: 0.7 %
EOSINOPHIL NFR BLD: 0.2 K/UL (ref 0–0.7)
EOSINOPHIL NFR BLD: 1.9 %
ERYTHROCYTE [DISTWIDTH] IN BLOOD BY AUTOMATED COUNT: 17.7 % (ref 11.7–14.9)
GRAN #: 8.6 K/UL (ref 1.4–6.5)
GRAN%: 84.4 %
HCT VFR BLD AUTO: 32.9 % (ref 36–48)
HGB BLD-MCNC: 10.3 G/DL (ref 12–15)
IMMATURE GRANS (ABS): 0.1 K/UL (ref 0–1)
IMMATURE GRANULOCYTES: 0.7 %
LYMPH #: 0.5 K/UL (ref 1.2–3.4)
LYMPH%: 4.9 %
MCH RBC QN AUTO: 30.5 PG (ref 25–35)
MCHC RBC AUTO-ENTMCNC: 31.3 G/DL (ref 31–36)
MCV RBC AUTO: 97.3 FL (ref 79–98)
MONO #: 0.8 K/UL (ref 0.1–0.6)
MONO%: 7.4 %
NUCLEATED RBCS: 0 %
PLATELET # BLD AUTO: 212 K/UL (ref 140–440)
PMV BLD AUTO: 10.5 FL (ref 8.8–12.7)
RBC # BLD AUTO: 3.38 M/UL (ref 3.5–5.5)
WBC # BLD AUTO: 10.2 K/UL (ref 5–10)

## 2017-07-25 ENCOUNTER — OFFICE VISIT (OUTPATIENT)
Dept: ORTHOPEDICS | Facility: CLINIC | Age: 82
End: 2017-07-25
Payer: MEDICARE

## 2017-07-25 VITALS
HEIGHT: 63 IN | HEART RATE: 73 BPM | WEIGHT: 108 LBS | SYSTOLIC BLOOD PRESSURE: 159 MMHG | DIASTOLIC BLOOD PRESSURE: 70 MMHG | BODY MASS INDEX: 19.14 KG/M2

## 2017-07-25 DIAGNOSIS — Z98.890 POST-OPERATIVE STATE: ICD-10-CM

## 2017-07-25 DIAGNOSIS — S42.231D CLOSED 3-PART FRACTURE OF SURGICAL NECK OF RIGHT HUMERUS WITH ROUTINE HEALING, SUBSEQUENT ENCOUNTER: Primary | ICD-10-CM

## 2017-07-25 PROCEDURE — 99024 POSTOP FOLLOW-UP VISIT: CPT | Mod: ,,, | Performed by: ORTHOPAEDIC SURGERY

## 2017-07-25 PROCEDURE — 73030 X-RAY EXAM OF SHOULDER: CPT | Mod: RT,,, | Performed by: ORTHOPAEDIC SURGERY

## 2017-07-25 RX ORDER — ONDANSETRON HYDROCHLORIDE 8 MG/1
TABLET, FILM COATED ORAL
Refills: 3 | COMMUNITY
Start: 2017-07-07

## 2017-07-25 NOTE — PROGRESS NOTES
Subjective:       Chief Complaint    Chief Complaint   Patient presents with    Right Shoulder - Post-op Evaluation     Post-op 4 weeks and 1 day.  DOS: 6/26/2017, open reduction internal fixation of comminuted, closed, displaced fracture proximal humerus right shoulder.  She states her shoulder is doing better and is currently wearing a splint.       HPI  Michelle Power is a 83 y.o.  female who presents Follow-up on proximal humeral fracture. Pain level 5-6/10. There's been no complications. Locally.      Past History  Past Medical History:   Diagnosis Date    Acute non-recurrent maxillary sinusitis 6/22/2017    Anemia     COPD (chronic obstructive pulmonary disease)     Costochondral pain     CRI (chronic renal insufficiency)     Depression     Hypertension     Osteoporosis     Rheumatoid arthritis      Past Surgical History:   Procedure Laterality Date    APPENDECTOMY      hardware in left femur      HYSTERECTOMY      TONSILLECTOMY       Social History     Social History    Marital status:      Spouse name: N/A    Number of children: N/A    Years of education: N/A     Occupational History    Not on file.     Social History Main Topics    Smoking status: Former Smoker     Packs/day: 1.00     Years: 15.00     Types: Cigarettes     Quit date: 1995    Smokeless tobacco: Never Used    Alcohol use No    Drug use: No    Sexual activity: Not on file     Other Topics Concern    Not on file     Social History Narrative    No narrative on file         Medications  Current Outpatient Prescriptions   Medication Sig    alprazolam (XANAX) 0.25 MG tablet Take 1 tablet (0.25 mg total) by mouth 2 (two) times daily as needed for Anxiety.    aspirin (ECOTRIN) 81 MG EC tablet Take 81 mg by mouth once daily.    azithromycin (Z-TRISH) 250 MG tablet Take 1 tablet (250 mg total) by mouth once daily. po on day 1 then 1 tab po on days 2-5    BACILLUS COAGULANS (PROBIOTIC, B. COAGULANS, ORAL) Take by mouth.     BREO ELLIPTA 100-25 mcg/dose diskus inhaler U 1 INHALATION BLISTER D    calcitonin, salmon, (FORTICAL) 200 unit/actuation nasal spray 1 spray by Nasal route once daily.    CEFTRIAXONE SODIUM (ROCEPHIN INJ) Inject as directed.    diltiaZEM (DILACOR XR) 120 MG CDCR Take 120 mg by mouth once daily.    famotidine (PEPCID) 20 MG tablet Take 20 mg by mouth 2 (two) times daily.    FLUTICASONE/VILANTEROL (BREO ELLIPTA INHL) Inhale into the lungs.    hydrocodone-acetaminophen 7.5-325mg (NORCO) 7.5-325 mg per tablet Take 1 tablet by mouth every 8 (eight) hours as needed for Pain.    methotrexate 2.5 MG Tab Take 4 tablets (10 mg total) by mouth every 7 days.    ondansetron (ZOFRAN) 8 MG tablet 1 T PO BID PRN    predniSONE (DELTASONE) 5 MG tablet Take 1 tablet (5 mg total) by mouth once daily.    PRENATAL #108-IRON,CARBONYL-FA ORAL Take by mouth.    sertraline (ZOLOFT) 25 MG tablet Take 1 tablet (25 mg total) by mouth once daily.    tramadol (ULTRAM) 50 mg tablet Take 50 mg by mouth every 6 (six) hours as needed for Pain.    UMECLIDINIUM BROMIDE (INCRUSE ELLIPTA INHL) Inhale into the lungs.    vitamin D 1000 units Tab Take 2,000 Units by mouth once daily.     No current facility-administered medications for this visit.        Allergies  Review of patient's allergies indicates:   Allergen Reactions    Codeine     Pcn [penicillins]     Plaquenil [hydroxychloroquine]     Shellfish containing products     Sulfa (sulfonamide antibiotics)          Review of Systems     Constitutional: Negative    HENT: Negative  Eyes: Negative  Respiratory: Negative  Cardiovascular: Negative  Musculoskeletal: HPI  Skin: Negative  Neurological: Negative  Hematological: Negative  Endocrine: Negative      Physical Exam    Vitals:    07/25/17 1052   BP: (!) 159/70   Pulse: 73     Physical Examination:Right shoulder-incision well-healed. -° range of motion right elbow.External rotation 30°, forward flexion 50°, abduction 60°.  [place] 60°.     Skin-  General appearance -  well appearing, and in no distress  Mental status - awake  Neck - supple  Chest -  symmetric air entry  Heart - normal rate   Abdomen - soft      Assessment/Plan   Closed 3-part fracture of surgical neck of right humerus with routine healing, subsequent encounter  -     X-ray Shoulder 2 or More Views Right    Post-operative state  -     X-ray Shoulder 2 or More Views Right      2 views of the right shoulder shows the fracture and some position. Alignment and healing well. Hardware is well positioned underintra-articular screws and the joint. Discontinue the sling. Physical therapy supervision only      This note was dictated using voice recognition software and may contain grammatical errors.

## 2017-07-26 LAB
BASOPHILS NFR BLD: 0.1 K/UL (ref 0–0.2)
BASOPHILS NFR BLD: 0.5 %
EOSINOPHIL NFR BLD: 0.1 K/UL (ref 0–0.7)
EOSINOPHIL NFR BLD: 0.8 %
ERYTHROCYTE [DISTWIDTH] IN BLOOD BY AUTOMATED COUNT: 17.2 % (ref 11.7–14.9)
GRAN #: 8.8 K/UL (ref 1.4–6.5)
GRAN%: 91.1 %
HCT VFR BLD AUTO: 34.9 % (ref 36–48)
HGB BLD-MCNC: 11.1 G/DL (ref 12–15)
IMMATURE GRANS (ABS): 0 K/UL (ref 0–1)
IMMATURE GRANULOCYTES: 0.3 %
LYMPH #: 0.5 K/UL (ref 1.2–3.4)
LYMPH%: 4.9 %
MCH RBC QN AUTO: 31.2 PG (ref 25–35)
MCHC RBC AUTO-ENTMCNC: 31.8 G/DL (ref 31–36)
MCV RBC AUTO: 98 FL (ref 79–98)
MONO #: 0.2 K/UL (ref 0.1–0.6)
MONO%: 2.4 %
NUCLEATED RBCS: 0 %
PLATELET # BLD AUTO: 242 K/UL (ref 140–440)
PMV BLD AUTO: 10 FL (ref 8.8–12.7)
RBC # BLD AUTO: 3.56 M/UL (ref 3.5–5.5)
WBC # BLD AUTO: 9.6 K/UL (ref 5–10)

## 2017-08-02 DIAGNOSIS — S42.231D CLOSED 3-PART FRACTURE OF SURGICAL NECK OF RIGHT HUMERUS WITH ROUTINE HEALING, SUBSEQUENT ENCOUNTER: Primary | ICD-10-CM

## 2017-08-02 DIAGNOSIS — Z98.890 STATUS POST SURGERY: ICD-10-CM

## 2017-08-02 LAB
HCT VFR BLD AUTO: 34.1 % (ref 36–48)
HGB BLD-MCNC: 11.2 G/DL (ref 12–15)
MCH RBC QN AUTO: 31.9 PG (ref 25–35)
MCHC RBC AUTO-ENTMCNC: 32.8 G/DL (ref 31–36)
MCV RBC AUTO: 97.2 FL (ref 79–98)
NUCLEATED RBCS: 0 %
PLATELET # BLD AUTO: 193 K/UL (ref 140–440)
RBC # BLD AUTO: 3.51 M/UL (ref 3.5–5.5)
WBC # BLD AUTO: 10.9 K/UL (ref 5–10)

## 2017-08-02 RX ORDER — HYDROCODONE BITARTRATE AND ACETAMINOPHEN 7.5; 325 MG/1; MG/1
1 TABLET ORAL EVERY 8 HOURS PRN
Qty: 60 TABLET | Refills: 0 | Status: SHIPPED | OUTPATIENT
Start: 2017-08-02 | End: 2017-08-25

## 2017-08-09 LAB
HCT VFR BLD AUTO: 36.1 % (ref 36–48)
HGB BLD-MCNC: 11.5 G/DL (ref 12–15)
MCH RBC QN AUTO: 31.3 PG (ref 25–35)
MCHC RBC AUTO-ENTMCNC: 31.9 G/DL (ref 31–36)
MCV RBC AUTO: 98.1 FL (ref 79–98)
NUCLEATED RBCS: 0 %
PLATELET # BLD AUTO: 236 K/UL (ref 140–440)
RBC # BLD AUTO: 3.68 M/UL (ref 3.5–5.5)
WBC # BLD AUTO: 10.1 K/UL (ref 5–10)

## 2017-08-14 ENCOUNTER — TELEPHONE (OUTPATIENT)
Dept: HEMATOLOGY/ONCOLOGY | Facility: CLINIC | Age: 82
End: 2017-08-14

## 2017-08-16 LAB
HCT VFR BLD AUTO: 33.2 % (ref 36–48)
HGB BLD-MCNC: 10.5 G/DL (ref 12–15)
MCH RBC QN AUTO: 31.2 PG (ref 25–35)
MCHC RBC AUTO-ENTMCNC: 31.6 G/DL (ref 31–36)
MCV RBC AUTO: 98.5 FL (ref 79–98)
NUCLEATED RBCS: 0 %
PLATELET # BLD AUTO: 217 K/UL (ref 140–440)
RBC # BLD AUTO: 3.37 M/UL (ref 3.5–5.5)
WBC # BLD AUTO: 8.3 K/UL (ref 5–10)

## 2017-08-18 ENCOUNTER — TELEPHONE (OUTPATIENT)
Dept: FAMILY MEDICINE | Facility: CLINIC | Age: 82
End: 2017-08-18

## 2017-08-21 ENCOUNTER — TELEPHONE (OUTPATIENT)
Dept: HEMATOLOGY/ONCOLOGY | Facility: CLINIC | Age: 82
End: 2017-08-21

## 2017-08-21 NOTE — TELEPHONE ENCOUNTER
Notified pt of Hgb at 10.5 and Procrit if she hadnot been receiving would not be resumed until Hgb was less than  10.

## 2017-08-21 NOTE — TELEPHONE ENCOUNTER
----- Message from Shirin Perez sent at 8/21/2017  9:58 AM CDT -----  Patient called in her procret shot is scheduled for Wednesday at the infusion center and would like to know the results of her labs to see if she needs to get shot. Labs were done by [email or web address].

## 2017-08-25 ENCOUNTER — OFFICE VISIT (OUTPATIENT)
Dept: ORTHOPEDICS | Facility: CLINIC | Age: 82
End: 2017-08-25
Payer: MEDICARE

## 2017-08-25 VITALS
SYSTOLIC BLOOD PRESSURE: 150 MMHG | WEIGHT: 108 LBS | HEIGHT: 63 IN | HEART RATE: 70 BPM | DIASTOLIC BLOOD PRESSURE: 80 MMHG | BODY MASS INDEX: 19.14 KG/M2

## 2017-08-25 DIAGNOSIS — S42.231D CLOSED 3-PART FRACTURE OF SURGICAL NECK OF RIGHT HUMERUS WITH ROUTINE HEALING, SUBSEQUENT ENCOUNTER: ICD-10-CM

## 2017-08-25 DIAGNOSIS — Z98.890 POST-OPERATIVE STATE: Primary | ICD-10-CM

## 2017-08-25 LAB
HCT VFR BLD AUTO: 35.5 % (ref 36–48)
HGB BLD-MCNC: 11.5 G/DL (ref 12–15)
MCH RBC QN AUTO: 32.1 PG (ref 25–35)
MCHC RBC AUTO-ENTMCNC: 32.4 G/DL (ref 31–36)
MCV RBC AUTO: 99.2 FL (ref 79–98)
NUCLEATED RBCS: 0 %
PLATELET # BLD AUTO: 204 K/UL (ref 140–440)
RBC # BLD AUTO: 3.58 M/UL (ref 3.5–5.5)
WBC # BLD AUTO: 10.5 K/UL (ref 5–10)

## 2017-08-25 PROCEDURE — 99024 POSTOP FOLLOW-UP VISIT: CPT | Mod: ,,, | Performed by: ORTHOPAEDIC SURGERY

## 2017-08-25 RX ORDER — UMECLIDINIUM 62.5 UG/1
1 AEROSOL, POWDER ORAL DAILY
Refills: 4 | COMMUNITY
Start: 2017-08-02

## 2017-08-25 NOTE — PROGRESS NOTES
Subjective:       Chief Complaint    Chief Complaint   Patient presents with    Post-op Evaluation     Post-op 8 weeks and 4 days.  DOS: 6/26/2017, open reduction internal fixation of comminuted, closed, displaced fracture proximal humerus right shoulder.  Still has some pain but it has improved.  No longer wearing the sling and using it more often.  Doing home P.T. 2 times a week and also doing her own exercises.       HPI  Michelle Power is a 83 y.o.  female who presents  Follow-up on right shoulder proximal humeral fracture. She is doing very well.      Past History  Past Medical History:   Diagnosis Date    Acute non-recurrent maxillary sinusitis 6/22/2017    Anemia     COPD (chronic obstructive pulmonary disease)     Costochondral pain     CRI (chronic renal insufficiency)     Depression     Hypertension     Osteoporosis     Rheumatoid arthritis      Past Surgical History:   Procedure Laterality Date    APPENDECTOMY      hardware in left femur      HYSTERECTOMY      TONSILLECTOMY       Social History     Social History    Marital status:      Spouse name: N/A    Number of children: N/A    Years of education: N/A     Occupational History    Not on file.     Social History Main Topics    Smoking status: Former Smoker     Packs/day: 1.00     Years: 15.00     Types: Cigarettes     Quit date: 1995    Smokeless tobacco: Never Used    Alcohol use No    Drug use: No    Sexual activity: Not on file     Other Topics Concern    Not on file     Social History Narrative    No narrative on file         Medications  Current Outpatient Prescriptions   Medication Sig    alprazolam (XANAX) 0.25 MG tablet Take 1 tablet (0.25 mg total) by mouth 2 (two) times daily as needed for Anxiety.    aspirin (ECOTRIN) 81 MG EC tablet Take 81 mg by mouth once daily.    azithromycin (Z-TRISH) 250 MG tablet Take 1 tablet (250 mg total) by mouth once daily. po on day 1 then 1 tab po on days 2-5    BACILLUS  COAGULANS (PROBIOTIC, B. COAGULANS, ORAL) Take by mouth.    BREO ELLIPTA 100-25 mcg/dose diskus inhaler U 1 INHALATION BLISTER D    calcitonin, salmon, (FORTICAL) 200 unit/actuation nasal spray 1 spray by Nasal route once daily.    CEFTRIAXONE SODIUM (ROCEPHIN INJ) Inject as directed.    diltiaZEM (DILACOR XR) 120 MG CDCR Take 120 mg by mouth once daily.    famotidine (PEPCID) 20 MG tablet Take 20 mg by mouth 2 (two) times daily.    FLUTICASONE/VILANTEROL (BREO ELLIPTA INHL) Inhale into the lungs.    INCRUSE ELLIPTA 62.5 mcg/actuation DsDv Inhale 1 puff into the lungs once daily.    methotrexate 2.5 MG Tab Take 4 tablets (10 mg total) by mouth every 7 days.    ondansetron (ZOFRAN) 8 MG tablet 1 T PO BID PRN    predniSONE (DELTASONE) 5 MG tablet Take 1 tablet (5 mg total) by mouth once daily.    PRENATAL #108-IRON,CARBONYL-FA ORAL Take by mouth.    sertraline (ZOLOFT) 25 MG tablet Take 1 tablet (25 mg total) by mouth once daily.    tramadol (ULTRAM) 50 mg tablet Take 50 mg by mouth every 6 (six) hours as needed for Pain.    vitamin D 1000 units Tab Take 2,000 Units by mouth once daily.     No current facility-administered medications for this visit.        Allergies  Review of patient's allergies indicates:   Allergen Reactions    Codeine     Pcn [penicillins]     Plaquenil [hydroxychloroquine]     Shellfish containing products     Sulfa (sulfonamide antibiotics)          Review of Systems     Constitutional: Negative    HENT: Negative  Eyes: Negative  Respiratory: Negative  Cardiovascular: Negative  Musculoskeletal: HPI  Skin: Negative  Neurological: Negative  Hematological: Negative  Endocrine: Negative      Physical Exam    Vitals:    08/25/17 1039   BP: (!) 150/80   Pulse: 70     Physical Examination:Passive abduction right shoulder 60°, external rotation 35°. Forward flexion 70°.     Skin-clear  General appearance -  well appearing, and in no distress  Mental status - awake  Neck -  supple  Chest -  symmetric air entry  Heart - normal rate   Abdomen - soft      Assessment/Plan   Post-operative state    Closed 3-part fracture of surgical neck of right humerus with routine healing, subsequent encounter      Okay to start PT and OT on her shoulder. Follow-up in one month. She is doing very well overall.      This note was dictated using voice recognition software and may contain grammatical errors.

## 2017-08-29 ENCOUNTER — TELEPHONE (OUTPATIENT)
Dept: HEMATOLOGY/ONCOLOGY | Facility: CLINIC | Age: 82
End: 2017-08-29

## 2017-08-29 NOTE — TELEPHONE ENCOUNTER
Patient called asking about labs and if she needs procrit. I called and let her know lab values. She doesn't need procrit this week

## 2017-08-30 LAB
HCT VFR BLD AUTO: 31.7 % (ref 36–48)
HGB BLD-MCNC: 10.6 G/DL (ref 12–15)
MCH RBC QN AUTO: 32.2 PG (ref 25–35)
MCHC RBC AUTO-ENTMCNC: 33.4 G/DL (ref 31–36)
MCV RBC AUTO: 96.4 FL (ref 79–98)
NUCLEATED RBCS: 0 %
PLATELET # BLD AUTO: 210 K/UL (ref 140–440)
RBC # BLD AUTO: 3.29 M/UL (ref 3.5–5.5)
WBC # BLD AUTO: 8.5 K/UL (ref 5–10)

## 2017-09-07 LAB
HCT VFR BLD AUTO: 34.9 % (ref 36–48)
HGB BLD-MCNC: 11.3 G/DL (ref 12–15)
MCH RBC QN AUTO: 32.2 PG (ref 25–35)
MCHC RBC AUTO-ENTMCNC: 32.4 G/DL (ref 31–36)
MCV RBC AUTO: 99.4 FL (ref 79–98)
NUCLEATED RBCS: 0 %
PLATELET # BLD AUTO: 255 K/UL (ref 140–440)
RBC # BLD AUTO: 3.51 M/UL (ref 3.5–5.5)
WBC # BLD AUTO: 11 K/UL (ref 5–10)

## 2017-09-07 RX ORDER — TRAMADOL HYDROCHLORIDE 50 MG/1
TABLET ORAL
Qty: 180 TABLET | Refills: 0 | Status: SHIPPED | OUTPATIENT
Start: 2017-09-07 | End: 2017-11-06 | Stop reason: SDUPTHER

## 2017-09-11 ENCOUNTER — OFFICE VISIT (OUTPATIENT)
Dept: FAMILY MEDICINE | Facility: CLINIC | Age: 82
End: 2017-09-11
Payer: MEDICARE

## 2017-09-11 VITALS
SYSTOLIC BLOOD PRESSURE: 106 MMHG | HEIGHT: 63 IN | DIASTOLIC BLOOD PRESSURE: 70 MMHG | HEART RATE: 76 BPM | WEIGHT: 111 LBS | RESPIRATION RATE: 20 BRPM | BODY MASS INDEX: 19.67 KG/M2

## 2017-09-11 DIAGNOSIS — M05.79 RHEUMATOID ARTHRITIS INVOLVING MULTIPLE SITES WITH POSITIVE RHEUMATOID FACTOR: ICD-10-CM

## 2017-09-11 DIAGNOSIS — J01.00 ACUTE NON-RECURRENT MAXILLARY SINUSITIS: Primary | ICD-10-CM

## 2017-09-11 PROBLEM — J32.0 MAXILLARY SINUSITIS: Status: ACTIVE | Noted: 2017-06-22

## 2017-09-11 PROCEDURE — 3008F BODY MASS INDEX DOCD: CPT | Mod: ,,, | Performed by: FAMILY MEDICINE

## 2017-09-11 PROCEDURE — 1159F MED LIST DOCD IN RCRD: CPT | Mod: ,,, | Performed by: FAMILY MEDICINE

## 2017-09-11 PROCEDURE — 90662 IIV NO PRSV INCREASED AG IM: CPT | Mod: ,,, | Performed by: FAMILY MEDICINE

## 2017-09-11 PROCEDURE — 99213 OFFICE O/P EST LOW 20 MIN: CPT | Mod: 25,,, | Performed by: FAMILY MEDICINE

## 2017-09-11 PROCEDURE — G0008 ADMIN INFLUENZA VIRUS VAC: HCPCS | Mod: ,,, | Performed by: FAMILY MEDICINE

## 2017-09-11 RX ORDER — HYDROCODONE BITARTRATE AND ACETAMINOPHEN 5; 325 MG/1; MG/1
1 TABLET ORAL EVERY 12 HOURS PRN
Qty: 60 TABLET | Refills: 0 | Status: SHIPPED | OUTPATIENT
Start: 2017-09-11 | End: 2017-09-21 | Stop reason: SDUPTHER

## 2017-09-11 RX ORDER — AZITHROMYCIN 250 MG/1
250 TABLET, FILM COATED ORAL DAILY
Qty: 6 TABLET | Refills: 0 | Status: SHIPPED | OUTPATIENT
Start: 2017-09-11 | End: 2017-09-21 | Stop reason: ALTCHOICE

## 2017-09-11 NOTE — PROGRESS NOTES
Subjective:       Patient ID: Michelle Power is a 83 y.o. female.    Chief Complaint: Sinus Problem and Medication Problem (discuss medication. )    Sinus Problem   This is a new problem. The current episode started in the past 7 days. The problem has been gradually worsening since onset. There has been no fever. Her pain is at a severity of 3/10. The pain is mild. Associated symptoms include congestion, coughing, shortness of breath (COPD), sinus pressure and sneezing. Pertinent negatives include no chills, diaphoresis, ear pain, headaches, hoarse voice, neck pain or swollen glands. Treatments tried: nasacort. The treatment provided no relief.     Review of Systems   Constitutional: Negative for chills and diaphoresis.   HENT: Positive for congestion, sinus pressure and sneezing. Negative for ear pain and hoarse voice.    Respiratory: Positive for cough and shortness of breath (COPD).    Musculoskeletal: Negative for neck pain.   Neurological: Negative for headaches.       Objective:      Physical Exam   Constitutional: She appears well-developed and well-nourished.   Nursing note and vitals reviewed.      Assessment:       1. Acute non-recurrent maxillary sinusitis    2. Rheumatoid arthritis involving multiple sites with positive rheumatoid factor        Plan:       Michelle was seen today for sinus problem and medication problem.    Diagnoses and all orders for this visit:    Acute non-recurrent maxillary sinusitis    Rheumatoid arthritis involving multiple sites with positive rheumatoid factor  -     hydrocodone-acetaminophen 5-325mg (NORCO) 5-325 mg per tablet; Take 1 tablet by mouth every 12 (twelve) hours as needed for Pain.    Other orders  -     Influenza - High Dose (65+) (PF) (IM)  -     azithromycin (Z-TRISH) 250 MG tablet; Take 1 tablet (250 mg total) by mouth once daily. po on day 1 then 1 tab po on days 2-5         Return in about 3 months (around 12/11/2017).

## 2017-09-13 LAB
HCT VFR BLD AUTO: 31.9 % (ref 36–48)
HGB BLD-MCNC: 10.4 G/DL (ref 12–15)
MCH RBC QN AUTO: 31.7 PG (ref 25–35)
MCHC RBC AUTO-ENTMCNC: 32.6 G/DL (ref 31–36)
MCV RBC AUTO: 97.3 FL (ref 79–98)
NUCLEATED RBCS: 0 %
PLATELET # BLD AUTO: 229 K/UL (ref 140–440)
RBC # BLD AUTO: 3.28 M/UL (ref 3.5–5.5)
WBC # BLD AUTO: 8 K/UL (ref 5–10)

## 2017-09-14 RX ORDER — ALPRAZOLAM 0.25 MG/1
TABLET ORAL
Qty: 60 TABLET | Refills: 0 | Status: SHIPPED | OUTPATIENT
Start: 2017-09-14 | End: 2017-10-16 | Stop reason: SDUPTHER

## 2017-09-20 ENCOUNTER — TELEPHONE (OUTPATIENT)
Dept: HEMATOLOGY/ONCOLOGY | Facility: CLINIC | Age: 82
End: 2017-09-20

## 2017-09-20 NOTE — TELEPHONE ENCOUNTER
Muareen called daughter yesterday. Patient not getting procrit tomorrow. Home health is suppose to redraw weekly. I called daughter today to verify.

## 2017-09-20 NOTE — TELEPHONE ENCOUNTER
----- Message from Cate Arellano sent at 9/19/2017 12:44 PM CDT -----  Pt called in asking that a nurse call her with her lab results from last week.

## 2017-09-21 ENCOUNTER — OFFICE VISIT (OUTPATIENT)
Dept: FAMILY MEDICINE | Facility: CLINIC | Age: 82
End: 2017-09-21
Payer: MEDICARE

## 2017-09-21 ENCOUNTER — OFFICE VISIT (OUTPATIENT)
Dept: RHEUMATOLOGY | Facility: CLINIC | Age: 82
End: 2017-09-21
Payer: MEDICARE

## 2017-09-21 VITALS
OXYGEN SATURATION: 95 % | HEART RATE: 80 BPM | HEIGHT: 63 IN | TEMPERATURE: 99 F | WEIGHT: 115 LBS | DIASTOLIC BLOOD PRESSURE: 70 MMHG | BODY MASS INDEX: 20.38 KG/M2 | SYSTOLIC BLOOD PRESSURE: 130 MMHG | RESPIRATION RATE: 20 BRPM

## 2017-09-21 VITALS
BODY MASS INDEX: 20.52 KG/M2 | HEIGHT: 63 IN | SYSTOLIC BLOOD PRESSURE: 150 MMHG | WEIGHT: 115.81 LBS | DIASTOLIC BLOOD PRESSURE: 70 MMHG

## 2017-09-21 DIAGNOSIS — M05.79 RHEUMATOID ARTHRITIS INVOLVING MULTIPLE SITES WITH POSITIVE RHEUMATOID FACTOR: Primary | ICD-10-CM

## 2017-09-21 DIAGNOSIS — J40 BRONCHITIS: ICD-10-CM

## 2017-09-21 DIAGNOSIS — J01.00 ACUTE NON-RECURRENT MAXILLARY SINUSITIS: ICD-10-CM

## 2017-09-21 DIAGNOSIS — M05.79 RHEUMATOID ARTHRITIS INVOLVING MULTIPLE SITES WITH POSITIVE RHEUMATOID FACTOR: ICD-10-CM

## 2017-09-21 DIAGNOSIS — J44.9 CHRONIC OBSTRUCTIVE PULMONARY DISEASE, UNSPECIFIED COPD TYPE: Primary | Chronic | ICD-10-CM

## 2017-09-21 PROCEDURE — 99213 OFFICE O/P EST LOW 20 MIN: CPT | Mod: ,,, | Performed by: INTERNAL MEDICINE

## 2017-09-21 PROCEDURE — 1125F AMNT PAIN NOTED PAIN PRSNT: CPT | Mod: ,,, | Performed by: INTERNAL MEDICINE

## 2017-09-21 PROCEDURE — 99213 OFFICE O/P EST LOW 20 MIN: CPT | Mod: 25,,, | Performed by: FAMILY MEDICINE

## 2017-09-21 PROCEDURE — 3008F BODY MASS INDEX DOCD: CPT | Mod: ,,, | Performed by: FAMILY MEDICINE

## 2017-09-21 PROCEDURE — 1159F MED LIST DOCD IN RCRD: CPT | Mod: ,,, | Performed by: FAMILY MEDICINE

## 2017-09-21 PROCEDURE — 3008F BODY MASS INDEX DOCD: CPT | Mod: ,,, | Performed by: INTERNAL MEDICINE

## 2017-09-21 PROCEDURE — 1159F MED LIST DOCD IN RCRD: CPT | Mod: ,,, | Performed by: INTERNAL MEDICINE

## 2017-09-21 RX ORDER — ALBUTEROL SULFATE 90 UG/1
2 AEROSOL, METERED RESPIRATORY (INHALATION) EVERY 6 HOURS PRN
Qty: 1 INHALER | Refills: 2 | Status: SHIPPED | OUTPATIENT
Start: 2017-09-21 | End: 2017-10-13 | Stop reason: SDUPTHER

## 2017-09-21 RX ORDER — FERROUS SULFATE 324(65)MG
325 TABLET, DELAYED RELEASE (ENTERIC COATED) ORAL DAILY
COMMUNITY

## 2017-09-21 RX ORDER — LEVOFLOXACIN 250 MG/1
250 TABLET ORAL DAILY
Qty: 10 TABLET | Refills: 0 | Status: SHIPPED | OUTPATIENT
Start: 2017-09-21 | End: 2017-10-01

## 2017-09-21 RX ORDER — BETAMETHASONE SODIUM PHOSPHATE AND BETAMETHASONE ACETATE 3; 3 MG/ML; MG/ML
6 INJECTION, SUSPENSION INTRA-ARTICULAR; INTRALESIONAL; INTRAMUSCULAR; SOFT TISSUE
Status: SHIPPED | OUTPATIENT
Start: 2017-09-21

## 2017-09-21 RX ORDER — HYDROCODONE BITARTRATE AND ACETAMINOPHEN 5; 325 MG/1; MG/1
1 TABLET ORAL EVERY 6 HOURS PRN
Qty: 120 TABLET | Refills: 0 | Status: SHIPPED | OUTPATIENT
Start: 2017-09-21 | End: 2017-10-21

## 2017-09-21 RX ORDER — TRIAMCINOLONE ACETONIDE 55 UG/1
2 SPRAY, METERED NASAL DAILY
COMMUNITY

## 2017-09-21 NOTE — PROGRESS NOTES
Subjective:       Patient ID: Michelle Power is a 83 y.o. female.    Chief Complaint: Shortness of Breath (x 1 week) and Medication Refill    Patient is follow-up from last visit she had an upper respiratory infection treated with Zithromax and cough medicine hasn't had a previous steroid injection and did not get one last visit says her symptoms have gotten worse some increasing shortness of breath and cough but no fever. Having to use albuterol MDI in addition to her nebulized treatments twice a day. Pulse ox this morning was not checked but yesterday morning was 97 at home. 94% today.      Shortness of Breath   This is a recurrent problem. The problem occurs every few minutes. Associated symptoms include leg swelling. Pertinent negatives include no chest pain or claudication.   Medication Refill   Pertinent negatives include no chest pain.     Review of Systems   Respiratory: Positive for shortness of breath.    Cardiovascular: Positive for leg swelling. Negative for chest pain and claudication.       Objective:      Physical Exam   Constitutional: She appears well-developed and well-nourished. No distress.   HENT:   Head: Normocephalic and atraumatic.   Mouth/Throat: No oropharyngeal exudate.   Eyes: Conjunctivae and EOM are normal. Pupils are equal, round, and reactive to light. Right eye exhibits no discharge. Left eye exhibits no discharge. No scleral icterus.   Cardiovascular: Normal rate, regular rhythm, S1 normal and S2 normal.  Exam reveals no gallop, no distant heart sounds and no friction rub.    Pulmonary/Chest: Accessory muscle usage present. No apnea, no tachypnea and no bradypnea. No respiratory distress. She has decreased breath sounds in the right lower field and the left lower field. She has wheezes in the right lower field and the left lower field. She has rhonchi in the right lower field and the left lower field.   Skin: She is not diaphoretic.       Assessment:       1. Chronic obstructive  pulmonary disease, unspecified COPD type    2. Acute non-recurrent maxillary sinusitis    3. Bronchitis    4. Rheumatoid arthritis involving multiple sites with positive rheumatoid factor        Plan:       Michelle was seen today for shortness of breath and medication refill.    Diagnoses and all orders for this visit:    Chronic obstructive pulmonary disease, unspecified COPD type    Acute non-recurrent maxillary sinusitis    Bronchitis  -     levoFLOXacin (LEVAQUIN) 250 MG tablet; Take 1 tablet (250 mg total) by mouth once daily.  -     X-Ray Chest PA And Lateral; Future  -     albuterol 90 mcg/actuation inhaler; Inhale 2 puffs into the lungs every 6 (six) hours as needed for Wheezing. Rescue    Rheumatoid arthritis involving multiple sites with positive rheumatoid factor  -     hydrocodone-acetaminophen 5-325mg (NORCO) 5-325 mg per tablet; Take 1 tablet by mouth every 6 (six) hours as needed for Pain.    Other orders  -     betamethasone acetate-betamethasone sodium phosphate injection 6 mg; Inject 1 mL (6 mg total) into the muscle one time.         No Follow-up on file.

## 2017-09-21 NOTE — PROGRESS NOTES
[facility] RHEUMATOLOGY            PROGRESS NOTE      Subjective:       Patient ID:   NAME: Michelle Power : 3/13/1934     83 y.o. female    Referring Doc: No ref. provider found  Other Physicians:    Chief Complaint:  Rheumatoid Arthritis and Pain (4/10 generalized)      History of Present Illness:     Patient returns today for a regularly scheduled follow-up visit for  RA     The patientIs doing well regarding her joints. She underwent surgery recently for fracture in the right arm. She is wheelchair bound and on oxygen continuously. No fevers chest pains. No gastrointestinal complaints            ROS:   GEN:  No  fever, night sweats . weight is stable   + fatigue  SKIN: no rashes, no bruising, no ulcerations, no Raynaud's  HEENT: no HA's, No visual changes, no mucosal ulcers, no sicca symptoms,  CV:   no CP, +SOB, PND,+ COFFMAN, no orthopnea, no palpitations  PULM+ SOB, occ cough, hemoptysis, sputum or pleuritic pain  GI:  no abdominal pain, nausea, vomiting, constipation, diarrhea, melanotic stools, BRBPR, hematemesis, no dysphagia  :   no dysuria  NEURO: no paresthesias, headaches, visual disturbances, muscle weakness  MUSCULOSKELETAL:no joint swelling, prolonged AM stiffness, no back pain, no muscle pain  Allergies:  Review of patient's allergies indicates:   Allergen Reactions    Codeine     Pcn [penicillins]     Plaquenil [hydroxychloroquine]     Shellfish containing products     Sulfa (sulfonamide antibiotics)        Medications:    Current Outpatient Prescriptions:     alprazolam (XANAX) 0.25 MG tablet, TAKE 1 TABLET(0.25 MG) BY MOUTH TWICE DAILY AS NEEDED FOR ANXIETY, Disp: 60 tablet, Rfl: 0    aspirin (ECOTRIN) 81 MG EC tablet, Take 81 mg by mouth once daily., Disp: , Rfl:     BACILLUS COAGULANS (PROBIOTIC, B. COAGULANS, ORAL), Take by mouth., Disp: , Rfl:     BREO ELLIPTA 100-25 mcg/dose diskus inhaler, U 1 INHALATION BLISTER D, Disp: , Rfl: 0    diltiaZEM (DILACOR XR) 120 MG CDCR, Take 120  "mg by mouth once daily., Disp: , Rfl:     famotidine (PEPCID) 20 MG tablet, Take 20 mg by mouth 2 (two) times daily., Disp: , Rfl:     ferrous sulfate 324 mg (65 mg iron) TbEC, Take 325 mg by mouth once daily., Disp: , Rfl:     FLUTICASONE/VILANTEROL (BREO ELLIPTA INHL), Inhale into the lungs., Disp: , Rfl:     hydrocodone-acetaminophen 5-325mg (NORCO) 5-325 mg per tablet, Take 1 tablet by mouth every 12 (twelve) hours as needed for Pain., Disp: 60 tablet, Rfl: 0    INCRUSE ELLIPTA 62.5 mcg/actuation DsDv, Inhale 1 puff into the lungs once daily., Disp: , Rfl: 4    methotrexate 2.5 MG Tab, Take 4 tablets (10 mg total) by mouth every 7 days., Disp: 16 tablet, Rfl: 2    ondansetron (ZOFRAN) 8 MG tablet, 1 T PO BID PRN, Disp: , Rfl: 3    POLYETHYLENE GLYCOL 3350 (MIRALAX ORAL), Take by mouth., Disp: , Rfl:     predniSONE (DELTASONE) 5 MG tablet, Take 1 tablet (5 mg total) by mouth once daily., Disp: 100 tablet, Rfl: 1    PRENATAL #108-IRON,CARBONYL-FA ORAL, Take by mouth., Disp: , Rfl:     sertraline (ZOLOFT) 25 MG tablet, Take 1 tablet (25 mg total) by mouth once daily., Disp: 30 tablet, Rfl: 11    triamcinolone (NASACORT) 55 mcg nasal inhaler, 2 sprays by Nasal route once daily., Disp: , Rfl:     vitamin D 1000 units Tab, Take 2,000 Units by mouth once daily., Disp: , Rfl:     azithromycin (Z-TRISH) 250 MG tablet, Take 1 tablet (250 mg total) by mouth once daily. po on day 1 then 1 tab po on days 2-5, Disp: 6 tablet, Rfl: 0    tramadol (ULTRAM) 50 mg tablet, TAKE 1 TABLET BY MOUTH EVERY 6 HOURS AS NEEDED FOR PAIN, Disp: 180 tablet, Rfl: 0    PMHx/PSHx Updates:      Objective:     Vitals:  Blood pressure (!) 150/70, height 5' 3" (1.6 m), weight 52.5 kg (115 lb 12.8 oz).    Physical Examination:   GEN: no apparent distress, comfortable; AAOx3  SKIN: no rashes,no ulceration, no Raynaud's, no petechiae, no SQ nodules,  HEAD: normal  EYES: no pallor, no icterus,  NECK: no masses, thyroid normal, trachea " midline, no LAD/LN's, supple  CV: RRR with no murmur; l S1 and S2 reg. ,no gallop no rubs,   CHEST: ; CTAB; normal breath sounds; no wheeze or crackles  MUSC/Skeletal: ROM normal; no crepitus; joints without synovitis,  no deformities  No joint swelling or tenderness of PIP, MCP, wrist, elbow, shoulder, or knee joints  EXTREM: no clubbing, cyanosis, no edema,normal  pulses   NEURO: grossly intact; motor WNL; AAOx3;   PSYCH: normal mood, affect and behavior  LYMPH: normal cervical, supraclavicular          Labs:   Lab Results   Component Value Date    WBC 8.0 09/13/2017    HGB 10.4 (L) 09/13/2017    HCT 31.9 (L) 09/13/2017    MCV 97.3 09/13/2017     09/13/2017    CMP  @LASTLAB(NA,K,CL,CO2,GLU,BUN,Creatinine,Calcium,PROT,Albumin,Bilitot,Alkphos,AST,ALT,CRP,ESR,RF,CCP,JIM,SSA,CPK,uric acid) )@  I have reviewed all available lab results and radiology reports.    Radiology/Diagnostic Studies:        Assessment/Plan:   (1) 83 y.o. female with diagnosis of Mature arthritis. This appears stable. We'll continue with low-dose methotrexate in view of patient being allergic to plaquenil.      Methotrexate 7-1/2 mg a week. Continue with folic acid .  Reviewed recent CBCs. Will check CMP          Discussion:     I have explained all of the above in detail and the patient understands all of the current recommendation(s). I have answered all questions to the best of my ability and to their complete satisfaction.       The patient is to continue with the current management plan         RTC in  3 months       Electronically signed by Vahid Hernandez MD

## 2017-09-22 ENCOUNTER — TELEPHONE (OUTPATIENT)
Dept: FAMILY MEDICINE | Facility: CLINIC | Age: 82
End: 2017-09-22

## 2017-09-22 ENCOUNTER — OFFICE VISIT (OUTPATIENT)
Dept: ORTHOPEDICS | Facility: CLINIC | Age: 82
End: 2017-09-22
Payer: MEDICARE

## 2017-09-22 VITALS
SYSTOLIC BLOOD PRESSURE: 161 MMHG | WEIGHT: 115 LBS | DIASTOLIC BLOOD PRESSURE: 81 MMHG | BODY MASS INDEX: 20.38 KG/M2 | HEIGHT: 63 IN | HEART RATE: 73 BPM

## 2017-09-22 DIAGNOSIS — S42.231D CLOSED 3-PART FRACTURE OF SURGICAL NECK OF RIGHT HUMERUS WITH ROUTINE HEALING, SUBSEQUENT ENCOUNTER: ICD-10-CM

## 2017-09-22 DIAGNOSIS — Z98.890 POST-OPERATIVE STATE: Primary | ICD-10-CM

## 2017-09-22 LAB
ALBUMIN SERPL-MCNC: 4.1 G/DL (ref 3.1–4.7)
ALP SERPL-CCNC: 71 IU/L (ref 40–104)
ALT (SGPT): 30 IU/L (ref 3–33)
AST SERPL-CCNC: 32 IU/L (ref 10–40)
BASOPHILS NFR BLD: 0.1 K/UL (ref 0–0.2)
BASOPHILS NFR BLD: 0.5 %
BILIRUB SERPL-MCNC: 0.6 MG/DL (ref 0.3–1)
BUN SERPL-MCNC: 31 MG/DL (ref 8–20)
CALCIUM SERPL-MCNC: 9.3 MG/DL (ref 7.7–10.4)
CHLORIDE: 103 MMOL/L (ref 98–110)
CO2 SERPL-SCNC: 27.1 MMOL/L (ref 22.8–31.6)
CREATININE: 1.28 MG/DL (ref 0.6–1.4)
EOSINOPHIL NFR BLD: 0 %
EOSINOPHIL NFR BLD: 0 K/UL (ref 0–0.7)
ERYTHROCYTE [DISTWIDTH] IN BLOOD BY AUTOMATED COUNT: 14.8 % (ref 11.7–14.9)
GLUCOSE: 140 MG/DL (ref 70–99)
GRAN #: 8.9 K/UL (ref 1.4–6.5)
GRAN%: 83.9 %
HCT VFR BLD AUTO: 34.8 % (ref 36–48)
HGB BLD-MCNC: 11.6 G/DL (ref 12–15)
IMMATURE GRANS (ABS): 0.4 K/UL (ref 0–1)
IMMATURE GRANULOCYTES: 4 %
LYMPH #: 0.4 K/UL (ref 1.2–3.4)
LYMPH%: 3.7 %
MCH RBC QN AUTO: 32.3 PG (ref 25–35)
MCHC RBC AUTO-ENTMCNC: 33.3 G/DL (ref 31–36)
MCV RBC AUTO: 96.9 FL (ref 79–98)
MONO #: 0.8 K/UL (ref 0.1–0.6)
MONO%: 7.9 %
NUCLEATED RBCS: 0 %
PLATELET # BLD AUTO: 232 K/UL (ref 140–440)
PMV BLD AUTO: 10.3 FL (ref 8.8–12.7)
POTASSIUM SERPL-SCNC: 4.6 MMOL/L (ref 3.5–5)
PROT SERPL-MCNC: 6.6 G/DL (ref 6–8.2)
RBC # BLD AUTO: 3.59 M/UL (ref 3.5–5.5)
SODIUM: 137 MMOL/L (ref 134–144)
WBC # BLD AUTO: 10.6 K/UL (ref 5–10)

## 2017-09-22 PROCEDURE — 99024 POSTOP FOLLOW-UP VISIT: CPT | Mod: ,,, | Performed by: ORTHOPAEDIC SURGERY

## 2017-09-22 NOTE — PROGRESS NOTES
Subjective:       Chief Complaint    Chief Complaint   Patient presents with    Post-op Evaluation     12 weeks and 4 days, right shoulder.  DOS: 6/26/2017, open reduction internal fixation of comminuted, closed, displaced fracture proximal humerus right shoulder. She still has physical therapy coming to her house twice a week.       HPI  Michelle Power is a 83 y.o.  female who presents Follow-up on ORIF right shoulder. Doing extremely well.           Past History  Past Medical History:   Diagnosis Date    Acute non-recurrent maxillary sinusitis 6/22/2017    Anemia     COPD (chronic obstructive pulmonary disease)     Costochondral pain     CRI (chronic renal insufficiency)     Depression     Humeral fracture     Hypertension     Osteoporosis     Rheumatoid arthritis      Past Surgical History:   Procedure Laterality Date    APPENDECTOMY      hardware in left femur      HYSTERECTOMY      TONSILLECTOMY       Social History     Social History    Marital status:      Spouse name: N/A    Number of children: N/A    Years of education: N/A     Occupational History    Not on file.     Social History Main Topics    Smoking status: Former Smoker     Packs/day: 1.00     Years: 15.00     Types: Cigarettes     Quit date: 1995    Smokeless tobacco: Never Used    Alcohol use No    Drug use: No    Sexual activity: Not on file     Other Topics Concern    Not on file     Social History Narrative    No narrative on file         Medications  Current Outpatient Prescriptions   Medication Sig    albuterol 90 mcg/actuation inhaler Inhale 2 puffs into the lungs every 6 (six) hours as needed for Wheezing. Rescue    alprazolam (XANAX) 0.25 MG tablet TAKE 1 TABLET(0.25 MG) BY MOUTH TWICE DAILY AS NEEDED FOR ANXIETY    aspirin (ECOTRIN) 81 MG EC tablet Take 81 mg by mouth once daily.    BACILLUS COAGULANS (PROBIOTIC, B. COAGULANS, ORAL) Take by mouth.    BREO ELLIPTA 100-25 mcg/dose diskus inhaler U 1  INHALATION BLISTER D    diltiaZEM (DILACOR XR) 120 MG CDCR Take 120 mg by mouth once daily.    famotidine (PEPCID) 20 MG tablet Take 20 mg by mouth 2 (two) times daily.    ferrous sulfate 324 mg (65 mg iron) TbEC Take 325 mg by mouth once daily.    FLUTICASONE/VILANTEROL (BREO ELLIPTA INHL) Inhale into the lungs.    hydrocodone-acetaminophen 5-325mg (NORCO) 5-325 mg per tablet Take 1 tablet by mouth every 6 (six) hours as needed for Pain.    INCRUSE ELLIPTA 62.5 mcg/actuation DsDv Inhale 1 puff into the lungs once daily.    levoFLOXacin (LEVAQUIN) 250 MG tablet Take 1 tablet (250 mg total) by mouth once daily.    methotrexate 2.5 MG Tab Take 4 tablets (10 mg total) by mouth every 7 days.    ondansetron (ZOFRAN) 8 MG tablet 1 T PO BID PRN    POLYETHYLENE GLYCOL 3350 (MIRALAX ORAL) Take by mouth.    predniSONE (DELTASONE) 5 MG tablet Take 1 tablet (5 mg total) by mouth once daily.    PRENATAL #108-IRON,CARBONYL-FA ORAL Take by mouth.    sertraline (ZOLOFT) 25 MG tablet Take 1 tablet (25 mg total) by mouth once daily.    tramadol (ULTRAM) 50 mg tablet TAKE 1 TABLET BY MOUTH EVERY 6 HOURS AS NEEDED FOR PAIN    triamcinolone (NASACORT) 55 mcg nasal inhaler 2 sprays by Nasal route once daily.    vitamin D 1000 units Tab Take 2,000 Units by mouth once daily.     Current Facility-Administered Medications   Medication    betamethasone acetate-betamethasone sodium phosphate injection 6 mg       Allergies  Review of patient's allergies indicates:   Allergen Reactions    Codeine     Pcn [penicillins]     Plaquenil [hydroxychloroquine]     Shellfish containing products     Sulfa (sulfonamide antibiotics)          Review of Systems     Constitutional: Negative    HENT: Negative  Eyes: Negative  Respiratory: Negative  Cardiovascular: Negative  Musculoskeletal: HPI  Skin: Negative  Neurological: Negative  Hematological: Negative  Endocrine: Negative      Physical Exam    Vitals:    09/22/17 1121   BP: (!)  161/81   Pulse: 73     Physical Examination:Scapulothoracic abduction right shoulder 90°. External rotation 45°. Scaption 75°. Flexion 100°. Nontender.     Skin-  General appearance -  well appearing, and in no distress  Mental status - awake  Neck - supple  Chest -  symmetric air entry  Heart - normal rate   Abdomen - soft      Assessment/Plan   Post-operative state    Closed 3-part fracture of surgical neck of right humerus with routine healing, subsequent encounter      Continue with the therapy as long as possible. Follow-up in 2 months.      This note was dictated using voice recognition software and may contain grammatical errors.

## 2017-09-22 NOTE — TELEPHONE ENCOUNTER
----- Message from Inocente Ramirez MD sent at 9/21/2017  4:13 PM CDT -----  Chest x-ray reviewed: No true pneumonia, but some initial discharge changes comparable with previouschest x-rays.

## 2017-09-29 LAB
HCT VFR BLD AUTO: 37 % (ref 36–48)
HGB BLD-MCNC: 12.1 G/DL (ref 12–15)
MCH RBC QN AUTO: 32.7 PG (ref 25–35)
MCHC RBC AUTO-ENTMCNC: 32.7 G/DL (ref 31–36)
MCV RBC AUTO: 100 FL (ref 79–98)
NUCLEATED RBCS: 0 %
PLATELET # BLD AUTO: 208 K/UL (ref 140–440)
RBC # BLD AUTO: 3.7 M/UL (ref 3.5–5.5)
WBC # BLD AUTO: 11.1 K/UL (ref 5–10)

## 2017-10-02 ENCOUNTER — TELEPHONE (OUTPATIENT)
Dept: FAMILY MEDICINE | Facility: CLINIC | Age: 82
End: 2017-10-02

## 2017-10-02 NOTE — TELEPHONE ENCOUNTER
Could not return call to patient has remarkable number was not set up for voicemail,interval home phone was unable to connect. We'll try again later

## 2017-10-09 ENCOUNTER — OFFICE VISIT (OUTPATIENT)
Dept: HEMATOLOGY/ONCOLOGY | Facility: CLINIC | Age: 82
End: 2017-10-09
Payer: MEDICARE

## 2017-10-09 VITALS
SYSTOLIC BLOOD PRESSURE: 125 MMHG | RESPIRATION RATE: 18 BRPM | BODY MASS INDEX: 20.38 KG/M2 | DIASTOLIC BLOOD PRESSURE: 69 MMHG | WEIGHT: 115 LBS | HEART RATE: 71 BPM | HEIGHT: 63 IN | TEMPERATURE: 98 F

## 2017-10-09 DIAGNOSIS — D63.1 ANEMIA OF CHRONIC RENAL FAILURE, STAGE 4 (SEVERE): Chronic | ICD-10-CM

## 2017-10-09 DIAGNOSIS — N18.4 ANEMIA OF CHRONIC RENAL FAILURE, STAGE 4 (SEVERE): Chronic | ICD-10-CM

## 2017-10-09 PROCEDURE — 99213 OFFICE O/P EST LOW 20 MIN: CPT | Mod: ,,, | Performed by: INTERNAL MEDICINE

## 2017-10-09 NOTE — LETTER
October 9, 2017      Inocente Ramirez MD  [address] [facility] [address]           [facility]  [address]  [address]  [facility] [address]  Phone: [phone]  Fax: [phone]          Patient: Michelle Power   MR Number: [id number]   YOB: 1934   Date of Visit: 10/9/2017       Dear Dr. Inocente Ramirez:    Thank you for referring Michelle Power to me for evaluation. Attached you will find relevant portions of my assessment and plan of care.    If you have questions, please do not hesitate to call me. I look forward to following Michelle Power along with you.    Sincerely,    Reed Dumont MD    Enclosure  CC:  No Recipients    If you would like to receive this communication electronically, please contact [email or web address] or [phone] to request more information on [facility] [employer] access.    For providers and/or their staff who would like to refer a patient to [facility], please contact us through our one-stop-shop provider referral line, [facility], at [phone].    If you feel you have received this communication in error or would no longer like to receive these types of communications, please e-mail [email or web address]

## 2017-10-09 NOTE — PROGRESS NOTES
PROGRESS NOTE    Subjective:       Patient ID: Michelle Power is a 83 y.o. female.    Chief Complaint:  Follow-up and Results (labs in epic)      History of Present Illness:   Michelle Power is a 83 y.o. female who presents with a history of Anemia of chronic renal failure.  Patient reports a fall and right arm fracture which was surgically repaired 2 weeks ago.  Patient had been feeling well up to that point.        Family and Social history reviewed and is unchanged from 3/15/2017.       ROS:  Review of Systems   Constitutional: Negative for fever.   Respiratory: Negative for shortness of breath.    Cardiovascular: Negative for chest pain and leg swelling.   Gastrointestinal: Negative for abdominal pain and blood in stool.   Genitourinary: Negative for hematuria.   Skin: Negative for rash.          Current Outpatient Prescriptions:     albuterol 90 mcg/actuation inhaler, Inhale 2 puffs into the lungs every 6 (six) hours as needed for Wheezing. Rescue, Disp: 1 Inhaler, Rfl: 2    alprazolam (XANAX) 0.25 MG tablet, TAKE 1 TABLET(0.25 MG) BY MOUTH TWICE DAILY AS NEEDED FOR ANXIETY, Disp: 60 tablet, Rfl: 0    aspirin (ECOTRIN) 81 MG EC tablet, Take 81 mg by mouth once daily., Disp: , Rfl:     BACILLUS COAGULANS (PROBIOTIC, B. COAGULANS, ORAL), Take by mouth., Disp: , Rfl:     BREO ELLIPTA 100-25 mcg/dose diskus inhaler, U 1 INHALATION BLISTER D, Disp: , Rfl: 0    diltiaZEM (DILACOR XR) 120 MG CDCR, Take 120 mg by mouth once daily., Disp: , Rfl:     famotidine (PEPCID) 20 MG tablet, Take 20 mg by mouth 2 (two) times daily., Disp: , Rfl:     ferrous sulfate 324 mg (65 mg iron) TbEC, Take 325 mg by mouth once daily., Disp: , Rfl:     FLUTICASONE/VILANTEROL (BREO ELLIPTA INHL), Inhale into the lungs., Disp: , Rfl:     hydrocodone-acetaminophen 5-325mg (NORCO) 5-325 mg per tablet, Take 1 tablet by mouth every 6 (six) hours as needed for Pain., Disp: 120  "tablet, Rfl: 0    INCRUSE ELLIPTA 62.5 mcg/actuation DsDv, Inhale 1 puff into the lungs once daily., Disp: , Rfl: 4    methotrexate 2.5 MG Tab, Take 4 tablets (10 mg total) by mouth every 7 days., Disp: 16 tablet, Rfl: 2    ondansetron (ZOFRAN) 8 MG tablet, 1 T PO BID PRN, Disp: , Rfl: 3    POLYETHYLENE GLYCOL 3350 (MIRALAX ORAL), Take by mouth., Disp: , Rfl:     predniSONE (DELTASONE) 5 MG tablet, Take 1 tablet (5 mg total) by mouth once daily., Disp: 100 tablet, Rfl: 1    PRENATAL #108-IRON,CARBONYL-FA ORAL, Take by mouth., Disp: , Rfl:     sertraline (ZOLOFT) 25 MG tablet, Take 1 tablet (25 mg total) by mouth once daily., Disp: 30 tablet, Rfl: 11    tramadol (ULTRAM) 50 mg tablet, TAKE 1 TABLET BY MOUTH EVERY 6 HOURS AS NEEDED FOR PAIN, Disp: 180 tablet, Rfl: 0    triamcinolone (NASACORT) 55 mcg nasal inhaler, 2 sprays by Nasal route once daily., Disp: , Rfl:     vitamin D 1000 units Tab, Take 2,000 Units by mouth once daily., Disp: , Rfl:     Current Facility-Administered Medications:     betamethasone acetate-betamethasone sodium phosphate injection 6 mg, 6 mg, Intramuscular, 1 time in Clinic/HOD, Inocente Ramirez MD        Objective:       Physical Examination:     /69   Pulse 71   Temp 97.6 °F (36.4 °C)   Resp 18   Ht 5' 3" (1.6 m)   Wt 52.2 kg (115 lb)   BMI 20.37 kg/m²     Physical Exam   Constitutional: She appears well-developed and well-nourished.   HENT:   Head: Normocephalic and atraumatic.   Right Ear: External ear normal.   Left Ear: External ear normal.   Mouth/Throat: Oropharynx is clear and moist.   Eyes: Conjunctivae are normal. Pupils are equal, round, and reactive to light.   Neck: No tracheal deviation present. No thyromegaly present.   Cardiovascular: Normal rate, regular rhythm and normal heart sounds.    Pulmonary/Chest: Effort normal and breath sounds normal.   Abdominal: Soft. Bowel sounds are normal. She exhibits no distension and no mass. There is no tenderness. "   Musculoskeletal: She exhibits no edema.   Neurological:   Neuro intact througout   Skin: No rash noted.   Psychiatric: She has a normal mood and affect. Her behavior is normal. Judgment and thought content normal.       Labs:   No results found for this or any previous visit (from the past 336 hour(s)).  CMP  Sodium   Date Value Ref Range Status   09/22/2017 137 134 - 144 mmol/L      Potassium   Date Value Ref Range Status   09/22/2017 4.6 3.5 - 5.0 mmol/L      Chloride   Date Value Ref Range Status   09/22/2017 103 98 - 110 mmol/L      CO2   Date Value Ref Range Status   09/22/2017 27.1 22.8 - 31.6 mmol/L      Glucose   Date Value Ref Range Status   09/22/2017 140 (H) 70 - 99 mg/dL      BUN, Bld   Date Value Ref Range Status   09/22/2017 31 (H) 8 - 20 mg/dL      Creatinine   Date Value Ref Range Status   09/22/2017 1.28 0.60 - 1.40 mg/dL      Calcium   Date Value Ref Range Status   09/22/2017 9.3 7.7 - 10.4 mg/dL      Total Protein   Date Value Ref Range Status   09/22/2017 6.6 6.0 - 8.2 g/dL      Albumin   Date Value Ref Range Status   09/22/2017 4.1 3.1 - 4.7 g/dL      Total Bilirubin   Date Value Ref Range Status   09/22/2017 0.6 0.3 - 1.0 mg/dL      Alkaline Phosphatase   Date Value Ref Range Status   09/22/2017 71 40 - 104 IU/L      AST   Date Value Ref Range Status   09/22/2017 32 10 - 40 IU/L      No results found for: CEA  No results found for: PSA        Assessment/Plan:     Problem List Items Addressed This Visit     Anemia of chronic renal failure, stage 4 (severe) (Chronic)     Patient is doing well and has had improving Hb numbers.  She is currently above Hb of 12.  Will check labs monthly for now and increase if her counts fall again.  Will continue to see patient every 3 months.          Relevant Orders    CBC auto differential      Other Visit Diagnoses    None.         Discussion:     Return in about 3 months (around 1/9/2018).      Electronically signed by Reed Goldstein

## 2017-10-09 NOTE — ASSESSMENT & PLAN NOTE
Patient is doing well and has had improving Hb numbers.  She is currently above Hb of 12.  Will check labs monthly for now and increase if her counts fall again.  Will continue to see patient every 3 months.

## 2017-10-12 ENCOUNTER — OFFICE VISIT (OUTPATIENT)
Dept: FAMILY MEDICINE | Facility: CLINIC | Age: 82
End: 2017-10-12
Payer: MEDICARE

## 2017-10-12 VITALS
RESPIRATION RATE: 20 BRPM | OXYGEN SATURATION: 93 % | BODY MASS INDEX: 21.26 KG/M2 | WEIGHT: 120 LBS | DIASTOLIC BLOOD PRESSURE: 70 MMHG | TEMPERATURE: 100 F | HEART RATE: 80 BPM | HEIGHT: 63 IN | SYSTOLIC BLOOD PRESSURE: 140 MMHG

## 2017-10-12 DIAGNOSIS — J40 BRONCHITIS: Primary | ICD-10-CM

## 2017-10-12 DIAGNOSIS — J44.1 COPD EXACERBATION: ICD-10-CM

## 2017-10-12 PROCEDURE — 99212 OFFICE O/P EST SF 10 MIN: CPT | Mod: ,,, | Performed by: FAMILY MEDICINE

## 2017-10-12 RX ORDER — LEVALBUTEROL INHALATION SOLUTION 1.25 MG/3ML
SOLUTION RESPIRATORY (INHALATION)
Refills: 6 | COMMUNITY
Start: 2017-10-09

## 2017-10-12 NOTE — PROGRESS NOTES
Subjective:       Patient ID: Michelle Power is a 83 y.o. female.    Chief Complaint: Nasal Congestion (cough); Fatigue; and Shortness of Breath    Extreme SOB on ambulation started yesterday, extremely weak dyspneic. Pulse ox this am 93% on O2. Using nebulizer 2-3  Plus inhalers. HYdrocodone working ok for RA pain.  Wet cough today. CXR from 9/23 shows chronic changes. NO temp greater than 99.8. Patient is compliant with all of her meds and is maxed out on inhalers, beta agonist is expectorating and it just completed a course of antibiotics.      Fatigue   Associated symptoms include fatigue. The symptoms are aggravated by coughing, exertion, walking and twisting. She has tried oral narcotics, immobilization, walking and sleep for the symptoms. The treatment provided no relief.   Shortness of Breath       Review of Systems   Constitutional: Positive for fatigue.   Respiratory: Positive for shortness of breath.        Objective:      Physical Exam   Constitutional: She appears well-developed and well-nourished.   HENT:   Head: Normocephalic and atraumatic.   Right Ear: External ear normal.   Left Ear: External ear normal.   Nose: Nose normal.   Mouth/Throat: Oropharynx is clear and moist.   Cardiovascular: Normal rate, regular rhythm, normal heart sounds and intact distal pulses.  Exam reveals no gallop and no friction rub.    No murmur heard.  Pulmonary/Chest: Effort normal and breath sounds normal. She has no rales. She exhibits no tenderness.   Abdominal: Soft. Bowel sounds are normal.       Assessment:       1. Bronchitis    2. COPD exacerbation        Plan:       Michelle was seen today for nasal congestion, fatigue and shortness of breath.    Diagnoses and all orders for this visit:    Bronchitis    COPD exacerbation         With acute on chronic dyspnea.  Patient to go to the ER for urgent evaluation and IV intervention if necessary.

## 2017-10-13 DIAGNOSIS — J40 BRONCHITIS: ICD-10-CM

## 2017-10-13 RX ORDER — ALBUTEROL SULFATE 90 UG/1
AEROSOL, METERED RESPIRATORY (INHALATION)
Qty: 18 G | Refills: 0 | Status: SHIPPED | OUTPATIENT
Start: 2017-10-13 | End: 2017-11-06 | Stop reason: SDUPTHER

## 2017-10-16 RX ORDER — ALPRAZOLAM 0.25 MG/1
TABLET ORAL
Qty: 60 TABLET | Refills: 0 | Status: SHIPPED | OUTPATIENT
Start: 2017-10-16 | End: 2017-11-06 | Stop reason: SDUPTHER

## 2017-10-20 ENCOUNTER — TELEPHONE (OUTPATIENT)
Dept: FAMILY MEDICINE | Facility: CLINIC | Age: 82
End: 2017-10-20

## 2017-10-20 DIAGNOSIS — J40 BRONCHITIS: ICD-10-CM

## 2017-10-20 DIAGNOSIS — I48.20 CHRONIC ATRIAL FIBRILLATION: Chronic | ICD-10-CM

## 2017-10-20 DIAGNOSIS — N18.4 CHRONIC KIDNEY DISEASE, STAGE IV (SEVERE): Chronic | ICD-10-CM

## 2017-10-20 DIAGNOSIS — J44.1 COPD EXACERBATION: Primary | ICD-10-CM

## 2017-10-20 DIAGNOSIS — M81.0 AGE-RELATED OSTEOPOROSIS WITHOUT CURRENT PATHOLOGICAL FRACTURE: ICD-10-CM

## 2017-10-20 NOTE — TELEPHONE ENCOUNTER
Mrs Martinez caregiver  [phone] called regarding Ms. Martinez Requesting order for home health re admit   [facility].  [phone] fax

## 2017-10-23 ENCOUNTER — TELEPHONE (OUTPATIENT)
Dept: HEMATOLOGY/ONCOLOGY | Facility: CLINIC | Age: 82
End: 2017-10-23

## 2017-10-23 NOTE — TELEPHONE ENCOUNTER
----- Message from Katy Mayen sent at 10/23/2017  3:11 PM CDT -----  Contact: Michelle- Caregiver  Michelle ([facility]) called in stating that the patient was discharged from home health. She is requesting new BW orders for next door. Please call Michelle once completed at [phone]. Thanks

## 2017-10-24 ENCOUNTER — HISTORICAL (OUTPATIENT)
Dept: ADMINISTRATIVE | Facility: HOSPITAL | Age: 82
End: 2017-10-24

## 2017-10-24 LAB
BASOPHILS NFR BLD: 0 K/UL (ref 0–0.2)
BASOPHILS NFR BLD: 0.1 %
EOSINOPHIL NFR BLD: 0 K/UL (ref 0–0.7)
EOSINOPHIL NFR BLD: 0.1 %
ERYTHROCYTE [DISTWIDTH] IN BLOOD BY AUTOMATED COUNT: 13.5 % (ref 12.5–14.5)
GRAN #: 13.9 K/UL (ref 1.4–6.5)
GRAN%: 89.6 %
HCT VFR BLD AUTO: 39.6 % (ref 36–48)
HGB BLD-MCNC: 12.5 G/DL (ref 12–15)
IMMATURE GRANS (ABS): 0.4 K/UL (ref 0–1)
IMMATURE GRANULOCYTES: 2.6 %
LYMPH #: 0.5 K/UL (ref 1.2–3.4)
LYMPH%: 3.4 %
MCH RBC QN AUTO: 32.1 PG (ref 25–35)
MCHC RBC AUTO-ENTMCNC: 31.6 G/DL (ref 31–36)
MCV RBC AUTO: 101.8 FL (ref 79–98)
MONO #: 0.7 K/UL (ref 0.1–0.6)
MONO%: 4.2 %
NUCLEATED RBCS: 0 %
NUCLEATED RED BLOOD CELLS: 0 /100 WBC
PERFORMED BY:: ABNORMAL
PLATELET # BLD AUTO: 215 K/UL (ref 140–440)
PMV BLD AUTO: 9.3 FL (ref 8.8–12.7)
RBC # BLD AUTO: 3.89 M/UL (ref 3.5–5.5)
WBC # BLD: 15.6 K/UL (ref 5–10)

## 2017-10-26 ENCOUNTER — TELEPHONE (OUTPATIENT)
Dept: HEMATOLOGY/ONCOLOGY | Facility: CLINIC | Age: 82
End: 2017-10-26

## 2017-10-26 NOTE — TELEPHONE ENCOUNTER
Patients caregiver was notified that hgb was 12.5, and that procrit was not needed.  Appointment cancelled with infusion .

## 2017-10-26 NOTE — TELEPHONE ENCOUNTER
----- Message from Mel Damon sent at 10/26/2017 10:08 AM CDT -----  Contact: CAREGIVER  PATIENT HAD BLOOD WORK DONE 10/24 AND NEEDS TO KNOW IF SHE IS GETTING PROCRIT SHOT. PLEASE ADVISE [phone]

## 2017-11-06 ENCOUNTER — TELEPHONE (OUTPATIENT)
Dept: FAMILY MEDICINE | Facility: CLINIC | Age: 82
End: 2017-11-06

## 2017-11-06 DIAGNOSIS — J40 BRONCHITIS: ICD-10-CM

## 2017-11-06 RX ORDER — ARFORMOTEROL TARTRATE 15 UG/2ML
SOLUTION RESPIRATORY (INHALATION)
Refills: 0 | COMMUNITY
Start: 2017-10-31 | End: 2018-06-01 | Stop reason: SDUPTHER

## 2017-11-06 RX ORDER — ALPRAZOLAM 0.25 MG/1
TABLET ORAL
Qty: 60 TABLET | Refills: 0 | Status: SHIPPED | OUTPATIENT
Start: 2017-11-06 | End: 2017-12-06 | Stop reason: SDUPTHER

## 2017-11-06 RX ORDER — BUDESONIDE 0.5 MG/2ML
INHALANT ORAL
Refills: 0 | COMMUNITY
Start: 2017-10-20

## 2017-11-06 RX ORDER — HYDROCODONE BITARTRATE AND ACETAMINOPHEN 5; 325 MG/1; MG/1
1 TABLET ORAL EVERY 6 HOURS PRN
Qty: 60 TABLET | Refills: 0 | Status: SHIPPED | OUTPATIENT
Start: 2017-11-06 | End: 2017-11-27 | Stop reason: SDUPTHER

## 2017-11-06 RX ORDER — PREDNISONE 20 MG/1
TABLET ORAL
Refills: 0 | COMMUNITY
Start: 2017-10-12

## 2017-11-06 NOTE — TELEPHONE ENCOUNTER
Please see note on patients chart and in Media requesting an order wound to right medial knee   Also please sign other form on her chart   Thank you

## 2017-11-07 RX ORDER — ALBUTEROL SULFATE 90 UG/1
AEROSOL, METERED RESPIRATORY (INHALATION)
Qty: 18 G | Refills: 0 | Status: SHIPPED | OUTPATIENT
Start: 2017-11-07

## 2017-11-16 ENCOUNTER — TELEPHONE (OUTPATIENT)
Dept: HEMATOLOGY/ONCOLOGY | Facility: CLINIC | Age: 82
End: 2017-11-16

## 2017-11-16 DIAGNOSIS — N18.4 ANEMIA OF CHRONIC KIDNEY FAILURE, STAGE 4 (SEVERE): Primary | ICD-10-CM

## 2017-11-16 DIAGNOSIS — D63.1 ANEMIA OF CHRONIC KIDNEY FAILURE, STAGE 4 (SEVERE): Primary | ICD-10-CM

## 2017-11-16 NOTE — TELEPHONE ENCOUNTER
----- Message from Negrita Farmer sent at 11/15/2017  9:23 AM CST -----  Pt insurance is requesting pt new [facility] and [facility] [employer] on this pt in order to approve procrit... Pt last H& H was over 30 Days ago ....please be advised

## 2017-11-21 ENCOUNTER — TELEPHONE (OUTPATIENT)
Dept: FAMILY MEDICINE | Facility: CLINIC | Age: 82
End: 2017-11-21

## 2017-11-21 DIAGNOSIS — F32.9 REACTIVE DEPRESSION: ICD-10-CM

## 2017-11-21 PROBLEM — F32.A DEPRESSION: Status: ACTIVE | Noted: 2017-11-21

## 2017-11-21 LAB
BASOPHILS NFR BLD: 0.1 K/UL (ref 0–0.2)
BASOPHILS NFR BLD: 0.6 %
EOSINOPHIL NFR BLD: 0.1 K/UL (ref 0–0.7)
EOSINOPHIL NFR BLD: 0.6 %
ERYTHROCYTE [DISTWIDTH] IN BLOOD BY AUTOMATED COUNT: 12.7 % (ref 12.5–14.5)
FERRITIN SERPL-MCNC: 209 NG/ML (ref 24–162)
GRAN #: 9.6 K/UL (ref 1.4–6.5)
GRAN%: 85.5 %
HCT VFR BLD AUTO: 37.1 % (ref 36–48)
HGB BLD-MCNC: 12.2 G/DL (ref 12–15)
IMMATURE GRANS (ABS): 0 K/UL (ref 0–1)
IMMATURE GRANULOCYTES: 0.4 %
LYMPH #: 0.6 K/UL (ref 1.2–3.4)
LYMPH%: 5.2 %
MCH RBC QN AUTO: 31.8 PG (ref 25–35)
MCHC RBC AUTO-ENTMCNC: 32.9 G/DL (ref 31–36)
MCV RBC AUTO: 96.6 FL (ref 79–98)
MONO #: 0.9 K/UL (ref 0.1–0.6)
MONO%: 7.7 %
NUCLEATED RBCS: 0 %
NUCLEATED RED BLOOD CELLS: 0 /100 WBC
PERFORMED BY:: ABNORMAL
PLATELET # BLD AUTO: 278 K/UL (ref 140–440)
PMV BLD AUTO: 9.2 FL (ref 8.8–12.7)
RBC # BLD AUTO: 3.84 M/UL (ref 3.5–5.5)
WBC # BLD: 11.2 K/UL (ref 5–10)

## 2017-11-21 RX ORDER — SERTRALINE HYDROCHLORIDE 50 MG/1
50 TABLET, FILM COATED ORAL DAILY
Qty: 30 TABLET | Refills: 11 | Status: SHIPPED | OUTPATIENT
Start: 2017-11-21 | End: 2018-11-21

## 2017-11-22 ENCOUNTER — OFFICE VISIT (OUTPATIENT)
Dept: ORTHOPEDICS | Facility: CLINIC | Age: 82
End: 2017-11-22
Payer: MEDICARE

## 2017-11-22 VITALS
SYSTOLIC BLOOD PRESSURE: 142 MMHG | WEIGHT: 120 LBS | BODY MASS INDEX: 21.26 KG/M2 | HEIGHT: 63 IN | DIASTOLIC BLOOD PRESSURE: 69 MMHG | HEART RATE: 90 BPM

## 2017-11-22 DIAGNOSIS — M25.611 POST-TRAUMATIC STIFF SHOULDER, RIGHT: Primary | ICD-10-CM

## 2017-11-22 DIAGNOSIS — Z98.890 POST-OPERATIVE STATE: ICD-10-CM

## 2017-11-22 DIAGNOSIS — S42.231D CLOSED 3-PART FRACTURE OF SURGICAL NECK OF RIGHT HUMERUS WITH ROUTINE HEALING, SUBSEQUENT ENCOUNTER: ICD-10-CM

## 2017-11-22 PROCEDURE — 99212 OFFICE O/P EST SF 10 MIN: CPT | Mod: ,,, | Performed by: ORTHOPAEDIC SURGERY

## 2017-11-22 NOTE — PROGRESS NOTES
Subjective:       Chief Complaint    Chief Complaint   Patient presents with    Right Shoulder - Post-op Evaluation    Follow-up     right shoulder.  DOS: 6/26/2017, open reduction internal fixation of comminuted, closed, displaced fracture proximal humerus right shoulder.       HPI  Michelle Power is a 83 y.o.  female who presents Follow-up on her right shoulder fracture repair. She is improving functionally with the use of right upper extremity. Has difficulty walking and is currently in a wheelchair.      Past History  Past Medical History:   Diagnosis Date    Acute non-recurrent maxillary sinusitis 6/22/2017    Anemia     COPD (chronic obstructive pulmonary disease)     Costochondral pain     CRI (chronic renal insufficiency)     Depression     Humeral fracture     Hypertension     Osteoporosis     Rheumatoid arthritis      Past Surgical History:   Procedure Laterality Date    APPENDECTOMY      hardware in left femur      HYSTERECTOMY      TONSILLECTOMY       Social History     Social History    Marital status:      Spouse name: N/A    Number of children: N/A    Years of education: N/A     Occupational History    Not on file.     Social History Main Topics    Smoking status: Former Smoker     Packs/day: 1.00     Years: 15.00     Types: Cigarettes     Quit date: 1995    Smokeless tobacco: Never Used    Alcohol use No    Drug use: No    Sexual activity: Not on file     Other Topics Concern    Not on file     Social History Narrative    No narrative on file         Medications  Current Outpatient Prescriptions   Medication Sig    ALPRAZolam (XANAX) 0.25 MG tablet TAKE 1 TABLET(0.25 MG) BY MOUTH TWICE DAILY AS NEEDED FOR ANXIETY    aspirin (ECOTRIN) 81 MG EC tablet Take 81 mg by mouth once daily.    BACILLUS COAGULANS (PROBIOTIC, B. COAGULANS, ORAL) Take by mouth.    BROVANA 15 mcg/2 mL Nebu VVN TWICE DAILY. RM AFTER U    budesonide (PULMICORT) 0.5 mg/2 mL nebulizer solution VVN  BID    diltiaZEM (DILACOR XR) 120 MG CDCR Take 120 mg by mouth once daily.    famotidine (PEPCID) 20 MG tablet Take 20 mg by mouth 2 (two) times daily.    ferrous sulfate 324 mg (65 mg iron) TbEC Take 325 mg by mouth once daily.    FLUTICASONE/VILANTEROL (BREO ELLIPTA INHL) Inhale into the lungs.    hydrocodone-acetaminophen 5-325mg (NORCO) 5-325 mg per tablet Take 1 tablet by mouth every 6 (six) hours as needed for Pain.    INCRUSE ELLIPTA 62.5 mcg/actuation DsDv Inhale 1 puff into the lungs once daily.    levalbuterol (XOPENEX) 1.25 mg/3 mL nebulizer solution VVN THREE TO QID UTD    methotrexate 2.5 MG Tab Take 4 tablets (10 mg total) by mouth every 7 days.    ondansetron (ZOFRAN) 8 MG tablet 1 T PO BID PRN    POLYETHYLENE GLYCOL 3350 (MIRALAX ORAL) Take by mouth.    predniSONE (DELTASONE) 20 MG tablet 3 TS ONCE D FOR 5 DAYS    predniSONE (DELTASONE) 5 MG tablet Take 1 tablet (5 mg total) by mouth once daily.    PRENATAL #108-IRON,CARBONYL-FA ORAL Take by mouth.    sertraline (ZOLOFT) 50 MG tablet Take 1 tablet (50 mg total) by mouth once daily.    triamcinolone (NASACORT) 55 mcg nasal inhaler 2 sprays by Nasal route once daily.    VENTOLIN HFA 90 mcg/actuation inhaler INHALE 2 PUFFS BY MOUTH EVERY 6 HOURS AS NEEDED FOR WHEEZING. RESCUE    vitamin D 1000 units Tab Take 2,000 Units by mouth once daily.     Current Facility-Administered Medications   Medication    betamethasone acetate-betamethasone sodium phosphate injection 6 mg       Allergies  Review of patient's allergies indicates:   Allergen Reactions    Codeine     Pcn [penicillins]     Plaquenil [hydroxychloroquine]     Shellfish containing products     Sulfa (sulfonamide antibiotics)          Review of Systems     Constitutional: Negative    HENT: Negative  Eyes: Negative  Respiratory: Negative  Cardiovascular: Negative  Musculoskeletal: HPI  Skin: Negative  Neurological: Negative  Hematological: Negative  Endocrine:  Negative      Physical Exam    Vitals:    11/22/17 0857   BP: (!) 142/69   Pulse: 90     Physical Examination:Right shoulder. Nontender. Scaption 70°, external rotation 30° elevation. 80°.     Skin-clear  General appearance -  well appearing, and in no distress  Mental status - awake  Neck - supple  Chest -  symmetric air entry  Heart - normal rate   Abdomen - soft      Assessment/Plan   Post-traumatic stiff shoulder, right    Post-operative state    Closed 3-part fracture of surgical neck of right humerus with routine healing, subsequent encounter            This note was dictated using voice recognition software and may contain grammatical errors.

## 2017-11-27 RX ORDER — HYDROCODONE BITARTRATE AND ACETAMINOPHEN 5; 325 MG/1; MG/1
1 TABLET ORAL EVERY 6 HOURS PRN
Qty: 60 TABLET | Refills: 0 | Status: SHIPPED | OUTPATIENT
Start: 2017-11-27 | End: 2017-12-12 | Stop reason: SDUPTHER

## 2017-12-01 RX ORDER — METHOTREXATE 2.5 MG/1
TABLET ORAL
Qty: 16 TABLET | Refills: 0 | Status: SHIPPED | OUTPATIENT
Start: 2017-12-01

## 2017-12-06 RX ORDER — ALPRAZOLAM 0.25 MG/1
TABLET ORAL
Qty: 60 TABLET | Refills: 0 | Status: SHIPPED | OUTPATIENT
Start: 2017-12-06

## 2017-12-12 RX ORDER — DILTIAZEM HYDROCHLORIDE 120 MG/1
CAPSULE, EXTENDED RELEASE ORAL
Refills: 2 | COMMUNITY
Start: 2017-12-04

## 2017-12-12 RX ORDER — HYDROCODONE BITARTRATE AND ACETAMINOPHEN 5; 325 MG/1; MG/1
1 TABLET ORAL EVERY 6 HOURS PRN
Qty: 60 TABLET | Refills: 0 | Status: SHIPPED | OUTPATIENT
Start: 2017-12-12 | End: 2017-12-29 | Stop reason: SDUPTHER

## 2017-12-29 RX ORDER — HYDROCODONE BITARTRATE AND ACETAMINOPHEN 5; 325 MG/1; MG/1
1 TABLET ORAL EVERY 6 HOURS PRN
Qty: 60 TABLET | Refills: 0 | Status: SHIPPED | OUTPATIENT
Start: 2017-12-29 | End: 2018-01-28

## 2018-01-08 ENCOUNTER — TELEPHONE (OUTPATIENT)
Dept: HEMATOLOGY/ONCOLOGY | Facility: CLINIC | Age: 83
End: 2018-01-08

## 2018-01-08 NOTE — TELEPHONE ENCOUNTER
----- Message from Shirin Perez sent at 1/5/2018  9:54 AM CST -----  Pt caregiver called canceled 01/08/18 appt with solomon they are putting [facility] on [facility] today.    Just an fyi

## 2018-03-02 ENCOUNTER — LAB VISIT (OUTPATIENT)
Dept: LAB | Facility: HOSPITAL | Age: 83
End: 2018-03-02
Attending: INTERNAL MEDICINE
Payer: MEDICARE

## 2018-03-02 DIAGNOSIS — N28.9 KIDNEY INSUFFICIENCY: Primary | ICD-10-CM

## 2018-03-02 LAB
ANION GAP SERPL CALC-SCNC: 9 MMOL/L
BUN SERPL-MCNC: 21 MG/DL
CALCIUM SERPL-MCNC: 9.6 MG/DL
CHLORIDE SERPL-SCNC: 100 MMOL/L
CO2 SERPL-SCNC: 29 MMOL/L
CREAT SERPL-MCNC: 1 MG/DL
EST. GFR  (AFRICAN AMERICAN): >60 ML/MIN/1.73 M^2
EST. GFR  (NON AFRICAN AMERICAN): 52 ML/MIN/1.73 M^2
GLUCOSE SERPL-MCNC: 162 MG/DL
POTASSIUM SERPL-SCNC: 4.5 MMOL/L
SODIUM SERPL-SCNC: 138 MMOL/L

## 2018-03-02 PROCEDURE — 36415 COLL VENOUS BLD VENIPUNCTURE: CPT

## 2018-03-02 PROCEDURE — 80048 BASIC METABOLIC PNL TOTAL CA: CPT

## 2018-03-09 ENCOUNTER — LAB VISIT (OUTPATIENT)
Dept: LAB | Facility: HOSPITAL | Age: 83
End: 2018-03-09
Attending: INTERNAL MEDICINE
Payer: MEDICARE

## 2018-03-09 DIAGNOSIS — N18.6 ESRD (END STAGE RENAL DISEASE): Primary | ICD-10-CM

## 2018-03-09 LAB
ANION GAP SERPL CALC-SCNC: 9 MMOL/L
BUN SERPL-MCNC: 22 MG/DL
CALCIUM SERPL-MCNC: 9.8 MG/DL
CHLORIDE SERPL-SCNC: 101 MMOL/L
CO2 SERPL-SCNC: 26 MMOL/L
CREAT SERPL-MCNC: 1.3 MG/DL
EST. GFR  (AFRICAN AMERICAN): 44 ML/MIN/1.73 M^2
EST. GFR  (NON AFRICAN AMERICAN): 38 ML/MIN/1.73 M^2
GLUCOSE SERPL-MCNC: 140 MG/DL
POTASSIUM SERPL-SCNC: 5.1 MMOL/L
SODIUM SERPL-SCNC: 136 MMOL/L

## 2018-03-09 PROCEDURE — 80048 BASIC METABOLIC PNL TOTAL CA: CPT

## 2018-03-09 PROCEDURE — 36415 COLL VENOUS BLD VENIPUNCTURE: CPT

## 2018-06-01 RX ORDER — ARFORMOTEROL TARTRATE 15 UG/2ML
SOLUTION RESPIRATORY (INHALATION)
Qty: 120 VIAL | Refills: 6 | Status: SHIPPED | OUTPATIENT
Start: 2018-06-01

## 2018-12-14 ENCOUNTER — DOCUMENTATION ONLY (OUTPATIENT)
Dept: FAMILY MEDICINE | Facility: CLINIC | Age: 83
End: 2018-12-14

## 2018-12-14 ENCOUNTER — TELEPHONE (OUTPATIENT)
Dept: HEMATOLOGY/ONCOLOGY | Facility: CLINIC | Age: 83
End: 2018-12-14